# Patient Record
Sex: FEMALE | Race: WHITE | NOT HISPANIC OR LATINO | Employment: UNEMPLOYED | URBAN - METROPOLITAN AREA
[De-identification: names, ages, dates, MRNs, and addresses within clinical notes are randomized per-mention and may not be internally consistent; named-entity substitution may affect disease eponyms.]

---

## 2018-08-13 ENCOUNTER — TELEPHONE (OUTPATIENT)
Dept: OBSTETRICS AND GYNECOLOGY | Facility: CLINIC | Age: 18
End: 2018-08-13

## 2018-08-13 NOTE — TELEPHONE ENCOUNTER
Spoke with the patient's Aunt, Ambika, and the patient wanted to go to the [facility] location. Patient scheduled for 8/16/18 @ 2:20 pm with Baltazar Kenyon.

## 2018-08-13 NOTE — TELEPHONE ENCOUNTER
----- Message from Harmony Ramirez sent at 8/13/2018  3:20 PM CDT -----  Pt Aunt(Ambika Higginbotham) at [phone]//states pt is about 5-6 month pregnant//first missed period was in March 2018//pt has not seen any Dr or Midwife since she became pregnant//is wanting to make an appt//pt has Medicaid Insurance//please call//thanks/marsha

## 2018-08-16 ENCOUNTER — PROCEDURE VISIT (OUTPATIENT)
Dept: OBSTETRICS AND GYNECOLOGY | Facility: CLINIC | Age: 18
End: 2018-08-16
Payer: MEDICAID

## 2018-08-16 ENCOUNTER — LAB VISIT (OUTPATIENT)
Dept: LAB | Facility: HOSPITAL | Age: 18
End: 2018-08-16
Attending: ADVANCED PRACTICE MIDWIFE
Payer: MEDICAID

## 2018-08-16 ENCOUNTER — INITIAL PRENATAL (OUTPATIENT)
Dept: OBSTETRICS AND GYNECOLOGY | Facility: CLINIC | Age: 18
End: 2018-08-16
Payer: MEDICAID

## 2018-08-16 VITALS — DIASTOLIC BLOOD PRESSURE: 62 MMHG | SYSTOLIC BLOOD PRESSURE: 100 MMHG | WEIGHT: 102.5 LBS

## 2018-08-16 DIAGNOSIS — O09.32 LATE PRENATAL CARE AFFECTING PREGNANCY IN SECOND TRIMESTER: ICD-10-CM

## 2018-08-16 DIAGNOSIS — Z36.89 ENCOUNTER FOR FETAL ANATOMIC SURVEY: ICD-10-CM

## 2018-08-16 DIAGNOSIS — O09.32 LATE PRENATAL CARE AFFECTING PREGNANCY IN SECOND TRIMESTER: Primary | ICD-10-CM

## 2018-08-16 LAB
ABO + RH BLD: NORMAL
BASOPHILS # BLD AUTO: 0.03 K/UL
BASOPHILS NFR BLD: 0.3 %
BLD GP AB SCN CELLS X3 SERPL QL: NORMAL
DIFFERENTIAL METHOD: ABNORMAL
EOSINOPHIL # BLD AUTO: 0 K/UL
EOSINOPHIL NFR BLD: 0.4 %
ERYTHROCYTE [DISTWIDTH] IN BLOOD BY AUTOMATED COUNT: 13.2 %
GLUCOSE SERPL-MCNC: 132 MG/DL
HCT VFR BLD AUTO: 33.1 %
HGB BLD-MCNC: 11.6 G/DL
IMM GRANULOCYTES # BLD AUTO: 0.21 K/UL
IMM GRANULOCYTES NFR BLD AUTO: 1.9 %
LYMPHOCYTES # BLD AUTO: 2.1 K/UL
LYMPHOCYTES NFR BLD: 18.9 %
MCH RBC QN AUTO: 33.6 PG
MCHC RBC AUTO-ENTMCNC: 35 G/DL
MCV RBC AUTO: 96 FL
MONOCYTES # BLD AUTO: 0.6 K/UL
MONOCYTES NFR BLD: 5.4 %
NEUTROPHILS # BLD AUTO: 8 K/UL
NEUTROPHILS NFR BLD: 73.1 %
NRBC BLD-RTO: 0 /100 WBC
PLATELET # BLD AUTO: 227 K/UL
PMV BLD AUTO: 11.4 FL
RBC # BLD AUTO: 3.45 M/UL
WBC # BLD AUTO: 10.94 K/UL

## 2018-08-16 PROCEDURE — 76805 OB US >/= 14 WKS SNGL FETUS: CPT | Mod: PBBFAC | Performed by: OBSTETRICS & GYNECOLOGY

## 2018-08-16 PROCEDURE — 87340 HEPATITIS B SURFACE AG IA: CPT

## 2018-08-16 PROCEDURE — 99204 OFFICE O/P NEW MOD 45 MIN: CPT | Mod: TH,S$PBB,, | Performed by: ADVANCED PRACTICE MIDWIFE

## 2018-08-16 PROCEDURE — 87086 URINE CULTURE/COLONY COUNT: CPT

## 2018-08-16 PROCEDURE — 86592 SYPHILIS TEST NON-TREP QUAL: CPT

## 2018-08-16 PROCEDURE — 86762 RUBELLA ANTIBODY: CPT

## 2018-08-16 PROCEDURE — 86901 BLOOD TYPING SEROLOGIC RH(D): CPT

## 2018-08-16 PROCEDURE — 99213 OFFICE O/P EST LOW 20 MIN: CPT | Mod: PBBFAC,TH,25 | Performed by: ADVANCED PRACTICE MIDWIFE

## 2018-08-16 PROCEDURE — 86703 HIV-1/HIV-2 1 RESULT ANTBDY: CPT

## 2018-08-16 PROCEDURE — 85025 COMPLETE CBC W/AUTO DIFF WBC: CPT

## 2018-08-16 PROCEDURE — 82950 GLUCOSE TEST: CPT

## 2018-08-16 PROCEDURE — 99999 PR PBB SHADOW E&M-EST. PATIENT-LVL III: CPT | Mod: PBBFAC,,, | Performed by: ADVANCED PRACTICE MIDWIFE

## 2018-08-16 PROCEDURE — 76805 OB US >/= 14 WKS SNGL FETUS: CPT | Mod: 26,S$PBB,, | Performed by: OBSTETRICS & GYNECOLOGY

## 2018-08-16 PROCEDURE — 87491 CHLMYD TRACH DNA AMP PROBE: CPT

## 2018-08-16 RX ORDER — PRENATAL WITH FERROUS FUM AND FOLIC ACID 3080; 920; 120; 400; 22; 1.84; 3; 20; 10; 1; 12; 200; 27; 25; 2 [IU]/1; [IU]/1; MG/1; [IU]/1; MG/1; MG/1; MG/1; MG/1; MG/1; MG/1; UG/1; MG/1; MG/1; MG/1; MG/1
TABLET ORAL
Status: ON HOLD | COMMUNITY
Start: 2018-07-26 | End: 2018-11-07 | Stop reason: HOSPADM

## 2018-08-16 NOTE — PROGRESS NOTES
Pt here for new ob visit, late to prenatal care  Oriented to the practice including EL/MD collaboration  Labs today including new ob labs and 1 hr gtt  Introduced to Coffective juan  Blue bag materials reviewed  Warning signs discussed  Thinking about giving the baby up for adoption, spoke with [facility]. Here today with aunt.  US today    DAVID Jenkins

## 2018-08-17 LAB
HBV SURFACE AG SERPL QL IA: NEGATIVE
HIV 1+2 AB+HIV1 P24 AG SERPL QL IA: NEGATIVE
RPR SER QL: NORMAL
RUBV IGG SER-ACNC: 28.1 IU/ML
RUBV IGG SER-IMP: REACTIVE

## 2018-08-18 LAB — BACTERIA UR CULT: NO GROWTH

## 2018-08-19 LAB
C TRACH DNA SPEC QL NAA+PROBE: NOT DETECTED
N GONORRHOEA DNA SPEC QL NAA+PROBE: NOT DETECTED

## 2018-08-30 ENCOUNTER — ROUTINE PRENATAL (OUTPATIENT)
Dept: OBSTETRICS AND GYNECOLOGY | Facility: CLINIC | Age: 18
End: 2018-08-30
Payer: MEDICAID

## 2018-08-30 VITALS — SYSTOLIC BLOOD PRESSURE: 112 MMHG | WEIGHT: 104.06 LBS | DIASTOLIC BLOOD PRESSURE: 70 MMHG

## 2018-08-30 DIAGNOSIS — Z23 NEED FOR DIPHTHERIA-TETANUS-PERTUSSIS (TDAP) VACCINE: ICD-10-CM

## 2018-08-30 DIAGNOSIS — O09.33 LATE PRENATAL CARE AFFECTING PREGNANCY IN THIRD TRIMESTER: Primary | ICD-10-CM

## 2018-08-30 PROCEDURE — 99213 OFFICE O/P EST LOW 20 MIN: CPT | Mod: TH,S$PBB,, | Performed by: ADVANCED PRACTICE MIDWIFE

## 2018-08-30 PROCEDURE — 99213 OFFICE O/P EST LOW 20 MIN: CPT | Mod: PBBFAC,TH | Performed by: ADVANCED PRACTICE MIDWIFE

## 2018-08-30 PROCEDURE — 99999 PR PBB SHADOW E&M-EST. PATIENT-LVL III: CPT | Mod: PBBFAC,,, | Performed by: ADVANCED PRACTICE MIDWIFE

## 2018-08-30 PROCEDURE — 90471 IMMUNIZATION ADMIN: CPT | Mod: PBBFAC

## 2018-08-30 NOTE — NURSING
Used two patient identifiers. Verified allergies. Tdap IM injection given to right deltoid; patient tolerated well. Asked patient to remain in clinic for 15 minutes to be monitored for adverse reaction. Patient verbalized understanding.

## 2018-08-30 NOTE — PROGRESS NOTES
Doing well, some tiredness, suggestions made  Tdap given today   PTL precautions and FM reviewed, when to go to hospital   Labs reviewed   Coffective counseling sheet Keep Baby Close discussed with mother. Reinforced rooming in practices, continued skin to skin, and quiet hours as requested by mother.  Encouraged mother to download Coffect[employer] mobile juan if she has not already done so. Mother verbalizes understanding.

## 2018-09-13 ENCOUNTER — ROUTINE PRENATAL (OUTPATIENT)
Dept: OBSTETRICS AND GYNECOLOGY | Facility: CLINIC | Age: 18
End: 2018-09-13
Payer: MEDICAID

## 2018-09-13 VITALS — SYSTOLIC BLOOD PRESSURE: 96 MMHG | DIASTOLIC BLOOD PRESSURE: 68 MMHG | WEIGHT: 108.44 LBS

## 2018-09-13 DIAGNOSIS — O26.849 FETAL SIZE INCONSISTENT WITH DATES: ICD-10-CM

## 2018-09-13 DIAGNOSIS — O09.32 LATE PRENATAL CARE AFFECTING PREGNANCY IN SECOND TRIMESTER: Primary | ICD-10-CM

## 2018-09-13 PROCEDURE — 99212 OFFICE O/P EST SF 10 MIN: CPT | Mod: TH,S$PBB,, | Performed by: ADVANCED PRACTICE MIDWIFE

## 2018-09-13 PROCEDURE — 99212 OFFICE O/P EST SF 10 MIN: CPT | Mod: PBBFAC,TH | Performed by: ADVANCED PRACTICE MIDWIFE

## 2018-09-13 PROCEDURE — 99999 PR PBB SHADOW E&M-EST. PATIENT-LVL II: CPT | Mod: PBBFAC,,, | Performed by: ADVANCED PRACTICE MIDWIFE

## 2018-09-13 NOTE — PROGRESS NOTES
Doing well, reports fm and denies vb lof or ctx   S<D  Still feeling fatigued encouraged to get outside and start walking   RTC 2 weeks with US

## 2018-09-26 ENCOUNTER — PROCEDURE VISIT (OUTPATIENT)
Dept: OBSTETRICS AND GYNECOLOGY | Facility: CLINIC | Age: 18
End: 2018-09-26
Payer: MEDICAID

## 2018-09-26 ENCOUNTER — ROUTINE PRENATAL (OUTPATIENT)
Dept: OBSTETRICS AND GYNECOLOGY | Facility: CLINIC | Age: 18
End: 2018-09-26
Payer: MEDICAID

## 2018-09-26 VITALS — SYSTOLIC BLOOD PRESSURE: 102 MMHG | WEIGHT: 110.25 LBS | DIASTOLIC BLOOD PRESSURE: 58 MMHG

## 2018-09-26 DIAGNOSIS — Z3A.33 33 WEEKS GESTATION OF PREGNANCY: ICD-10-CM

## 2018-09-26 DIAGNOSIS — O26.849 FETAL SIZE INCONSISTENT WITH DATES: ICD-10-CM

## 2018-09-26 DIAGNOSIS — O09.32 LATE PRENATAL CARE AFFECTING PREGNANCY IN SECOND TRIMESTER: Primary | ICD-10-CM

## 2018-09-26 PROCEDURE — 76819 FETAL BIOPHYS PROFIL W/O NST: CPT | Mod: 26,59,S$PBB, | Performed by: OBSTETRICS & GYNECOLOGY

## 2018-09-26 PROCEDURE — 99212 OFFICE O/P EST SF 10 MIN: CPT | Mod: TH,S$PBB,, | Performed by: MIDWIFE

## 2018-09-26 PROCEDURE — 76819 FETAL BIOPHYS PROFIL W/O NST: CPT | Mod: 59,PBBFAC | Performed by: OBSTETRICS & GYNECOLOGY

## 2018-09-26 PROCEDURE — 99213 OFFICE O/P EST LOW 20 MIN: CPT | Mod: PBBFAC,TH | Performed by: MIDWIFE

## 2018-09-26 PROCEDURE — 76816 OB US FOLLOW-UP PER FETUS: CPT | Mod: 59,PBBFAC | Performed by: OBSTETRICS & GYNECOLOGY

## 2018-09-26 PROCEDURE — 99999 PR PBB SHADOW E&M-EST. PATIENT-LVL III: CPT | Mod: PBBFAC,,, | Performed by: MIDWIFE

## 2018-09-26 PROCEDURE — 76816 OB US FOLLOW-UP PER FETUS: CPT | Mod: 26,59,S$PBB, | Performed by: OBSTETRICS & GYNECOLOGY

## 2018-09-26 NOTE — PROGRESS NOTES
Doing well, good fm, US today for S<D, EFW 3# 13oz, 10%, MVP 4.4, AC 36%, FL <1%, will US at 36 wks to re assess growth, discussed GBS, rtc 2 wks

## 2018-09-26 NOTE — PATIENT INSTRUCTIONS
Kick Counts    Its normal to worry about your babys health. One way you can know your babys doing well is to record the babys movements once a day. This is called a kick count. Remember to take your kick count records to all your appointments with your healthcare provider.  How to count kicks  Here are tips for counting kicks:  · Choose a time when the baby is active, such as after a meal.   · Sit comfortably or lie on your side.   · The first time the baby moves, write down the time.   · Count each movement until the baby has moved 10 times. This can take from 20 minutes to 2 hours.   · Try to do it at the same time each day.  When to call your healthcare provider  Call your healthcare provider right away if you notice any of the following:  · Your baby moves fewer than 10 times in 2 hours while youre doing kick counts.  · Your baby moves much less often than on the days before.  · You have not felt your baby move all day.  Date Last Reviewed: 2015-2017 [employer]. [address] [place] [address]. All rights reserved. This information is not intended as a substitute for professional medical care. Always follow your healthcare professional's instructions.        Premature Labor    Premature labor ( labor) is when symptoms of labor occur before 37 weeks of pregnancy. (This is 3 weeks before your due date.) Premature labor can lead to premature delivery. This means giving birth to your baby early. Babies need at least 37 weeks of pregnancy for all the organs to develop normally. The earlier the delivery, the greater the risks to the baby.  In most cases, the cause of premature labor is unknown. But certain factors may make the problem more likely. These include:  · History of premature labor with other pregnancies  · Smoking  · Alcohol or substance abuse  · Low pre-pregnancy weight or weight gain during pregnancy  · Short time period between pregnancies  · Being  pregnant with twins, triplets, or more  · History of certain types of surgery on the cervix or uterus  · Having a short cervix  · Certain infections  There are a number of other risk factors. Ask your healthcare provider to help you understand the risk factors specific to your case. Then find out what you can do to control or reduce them.  Contractions are one of the main signs of premature labor. A contraction is different from cramping. It may feel painful and the belly (abdomen) may get hard. It can last from a few seconds to a few minutes. Some women may feel only a sense of pressure in the belly, thighs, rectum, or vagina. Some may feel only the hardening of the uterus without pain or pressure. Or there may be a constant pain the lower back, which spreads forward toward the belly.    Premature labor can often be treated with medicines. A hospital stay will likely be needed. If labor is stopped successfully and you and your baby are both healthy, you may be discharged to continue care at home.  Home care  · Ask your provider any questions you have. Be certain you understand how to care for yourself at home. Also follow all recommendations given by your healthcare providers.  · Learn the signs of premature labor. Watch for these signs when you get home.  · Limit or restrict activities as advised. This may include stopping certain physical activities and cutting back hours at work.  · Avoid doing any strenuous work. Ask family and friends for help with tasks and support at home, if needed.  · Dont smoke, drink alcohol, or use other harmful substances.  · Take steps to reduce stress.  · Report any unusual symptoms to your provider.  Follow-up care  Follow up with your healthcare provider, or as directed. Weekly visits with your provider may be needed.  When to seek medical advice  Call your healthcare provider right away if any of these occur:  · Regular or frequent contractions, whether they are painful or  not  · Pressure in the pelvis  · Pressure in the lower belly or mild cramping in your belly with or without diarrhea  · Constant low, dull backache  · Gush or slow leaking of water from your vagina  · Change in vaginal discharge (watery, mucus, or bloody)  · Any vaginal bleeding  · Decreased movement of your baby  Date Last Reviewed: 2015  © 5918-6899 [employer]. [address] [place] [address], [place] [address]. All rights reserved. This information is not intended as a substitute for professional medical care. Always follow your healthcare professional's instructions.        Understanding  Labor  Going into labor before your 37th week of pregnancy is called  labor.  labor can cause your baby to be born too soon. This can lead to a number of health problems that may affect your baby.     Before labor, the cervix is thick and closed.       In  labor, the cervix begins to efface (thin) and dilate (open).      Symptoms of  labor  If you believe youre having  labor, get medical help right away. Contractions alone dont mean youre in  labor. What matters more are changes in your cervix (the lower end of the uterus). Symptoms of  labor include:  · Four or more contractions per hour  · Strong contractions  · Constant menstrual-like cramping  · Low-back pain  · Mucous or bloody vaginal discharge  · Bleeding or spotting in the second or third trimester  Evaluating  labor  Your healthcare provider will try to find out whether youre in  labor or whether youre just having contractions. He or she may watch you for a few hours. The following tests may be done:  · Pelvic exam to see if your cervix has effaced (thinned) and dilated (opened)  · Uterine activity monitoring to detect contractions  · Fetal monitoring to check the health of your baby  · Ultrasound to check your babys size and position  · Amniocentesis to check how mature your babys  lungs are  Caring for yourself at home  If you have  contractions, but your cervix is still thick and closed, your healthcare provider may ask you to do the following at home:  · Drink plenty of water.  · Do fewer activities.  · Rest in bed on your side.  · Avoid intercourse and nipple stimulation.  When to call your healthcare provider  Call your healthcare provider if you notice any of these:  · Four or more contractions per hour  · Bag of water breaks  · Bleeding or spotting   If you need hospital care   labor often requires that you have hospital care and complete bed rest. You may have an IV (intravenous) line to get fluids. You may be given pills or injections to help prevent contractions. Finally, you may receive medicine (corticosteroids) that helps your babys lungs mature more rapidly.  Are you at risk?  Any pregnant woman can have  labor. It may start for no reason. But these risk factors can increase your chances:  · Past  labor or past early birth  · Smoking, drug, or alcohol use during pregnancy  · Multiple fetuses (twins or more)  · Problems with the shape of the uterus  · Bleeding during the pregnancy  The dangers of  birth  A baby born too soon may have health problems. This is because the baby didnt have enough time to mature. Some of the risks for your baby include:  · Not breastfeeding or feeding well  · Having immature lungs  · Bleeding in the brain  · Dying  Reaching term  Your goal is to get as close to term as you can before giving birth. The closer you get to term, the higher your chance of having a healthy baby. Work with your healthcare provider. Together, you can take steps that may keep you from giving birth too early.  Date Last Reviewed: 2016-2017 [employer]. [address] [place] [address], [place] [address]. All rights reserved. This information is not intended as a substitute for professional medical care. Always follow your  healthcare professional's instructions.

## 2018-10-12 ENCOUNTER — ROUTINE PRENATAL (OUTPATIENT)
Dept: OBSTETRICS AND GYNECOLOGY | Facility: CLINIC | Age: 18
End: 2018-10-12
Payer: MEDICAID

## 2018-10-12 VITALS — WEIGHT: 116.88 LBS | SYSTOLIC BLOOD PRESSURE: 108 MMHG | DIASTOLIC BLOOD PRESSURE: 76 MMHG

## 2018-10-12 DIAGNOSIS — O09.32 LATE PRENATAL CARE AFFECTING PREGNANCY IN SECOND TRIMESTER: Primary | ICD-10-CM

## 2018-10-12 PROCEDURE — 99212 OFFICE O/P EST SF 10 MIN: CPT | Mod: PBBFAC,TH | Performed by: ADVANCED PRACTICE MIDWIFE

## 2018-10-12 PROCEDURE — 99999 PR PBB SHADOW E&M-EST. PATIENT-LVL II: CPT | Mod: PBBFAC,,, | Performed by: ADVANCED PRACTICE MIDWIFE

## 2018-10-12 PROCEDURE — 99212 OFFICE O/P EST SF 10 MIN: CPT | Mod: TH,S$PBB,, | Performed by: ADVANCED PRACTICE MIDWIFE

## 2018-10-12 NOTE — PROGRESS NOTES
Doing well, reports fm and denies vb lof or cts   Labor precautions reviewed and when to go to ldr   RTC 1 week with GBS and US for growth

## 2018-10-22 ENCOUNTER — ROUTINE PRENATAL (OUTPATIENT)
Dept: OBSTETRICS AND GYNECOLOGY | Facility: CLINIC | Age: 18
End: 2018-10-22
Payer: MEDICAID

## 2018-10-22 ENCOUNTER — PROCEDURE VISIT (OUTPATIENT)
Dept: OBSTETRICS AND GYNECOLOGY | Facility: CLINIC | Age: 18
End: 2018-10-22
Payer: MEDICAID

## 2018-10-22 VITALS — DIASTOLIC BLOOD PRESSURE: 70 MMHG | WEIGHT: 115.94 LBS | SYSTOLIC BLOOD PRESSURE: 110 MMHG

## 2018-10-22 DIAGNOSIS — O26.849 FETAL SIZE INCONSISTENT WITH DATES: ICD-10-CM

## 2018-10-22 DIAGNOSIS — O28.3 ABNORMAL FETAL ULTRASOUND: ICD-10-CM

## 2018-10-22 DIAGNOSIS — O09.32 LATE PRENATAL CARE AFFECTING PREGNANCY IN SECOND TRIMESTER: ICD-10-CM

## 2018-10-22 DIAGNOSIS — O09.32 LATE PRENATAL CARE AFFECTING PREGNANCY IN SECOND TRIMESTER: Primary | ICD-10-CM

## 2018-10-22 LAB
AMPHET+METHAMPHET UR QL: NEGATIVE
BARBITURATES UR QL SCN>200 NG/ML: NEGATIVE
BENZODIAZ UR QL SCN>200 NG/ML: NEGATIVE
BZE UR QL SCN: NEGATIVE
CANNABINOIDS UR QL SCN: NEGATIVE
CREAT UR-MCNC: 178 MG/DL
METHADONE UR QL SCN>300 NG/ML: NEGATIVE
OPIATES UR QL SCN: NEGATIVE
PCP UR QL SCN>25 NG/ML: NEGATIVE
TOXICOLOGY INFORMATION: NORMAL

## 2018-10-22 PROCEDURE — 87081 CULTURE SCREEN ONLY: CPT

## 2018-10-22 PROCEDURE — 76816 OB US FOLLOW-UP PER FETUS: CPT | Mod: 26,S$PBB,, | Performed by: OBSTETRICS & GYNECOLOGY

## 2018-10-22 PROCEDURE — 76819 FETAL BIOPHYS PROFIL W/O NST: CPT | Mod: PBBFAC | Performed by: OBSTETRICS & GYNECOLOGY

## 2018-10-22 PROCEDURE — 99213 OFFICE O/P EST LOW 20 MIN: CPT | Mod: PBBFAC,TH | Performed by: ADVANCED PRACTICE MIDWIFE

## 2018-10-22 PROCEDURE — 76816 OB US FOLLOW-UP PER FETUS: CPT | Mod: PBBFAC | Performed by: OBSTETRICS & GYNECOLOGY

## 2018-10-22 PROCEDURE — 80307 DRUG TEST PRSMV CHEM ANLYZR: CPT

## 2018-10-22 PROCEDURE — 99999 PR PBB SHADOW E&M-EST. PATIENT-LVL III: CPT | Mod: PBBFAC,,, | Performed by: ADVANCED PRACTICE MIDWIFE

## 2018-10-22 PROCEDURE — 76819 FETAL BIOPHYS PROFIL W/O NST: CPT | Mod: 26,S$PBB,, | Performed by: OBSTETRICS & GYNECOLOGY

## 2018-10-22 PROCEDURE — 99213 OFFICE O/P EST LOW 20 MIN: CPT | Mod: TH,S$PBB,, | Performed by: ADVANCED PRACTICE MIDWIFE

## 2018-10-22 NOTE — PROGRESS NOTES
Doing well today with complaints of head cold. No color to mucus and not currently taking any meds. Told pt she can take OTC meds that do not have decongestant.   US today shows BPP 8/8, Placenta anterior, Cephalic presentation. DORETHA 4.8cm with MVP of 3.3 cm. EFW 5#12oz (15%). HC and fetal long bones measuring <1%. FHR ranges from 105-126 throughout scan. Discussed US with pt and made an appt to see MFM on Monday 10/29.  GBS collected today. Pt declines VE.   Denies UC or cramping.   Reviewed FKC/ROM/Labor/Bleeding precautions.

## 2018-10-25 LAB — BACTERIA SPEC AEROBE CULT: NORMAL

## 2018-10-29 ENCOUNTER — ROUTINE PRENATAL (OUTPATIENT)
Dept: OBSTETRICS AND GYNECOLOGY | Facility: CLINIC | Age: 18
End: 2018-10-29
Payer: MEDICAID

## 2018-10-29 VITALS — SYSTOLIC BLOOD PRESSURE: 110 MMHG | WEIGHT: 119.25 LBS | DIASTOLIC BLOOD PRESSURE: 70 MMHG

## 2018-10-29 DIAGNOSIS — O36.5930 POOR FETAL GROWTH AFFECTING MANAGEMENT OF MOTHER IN THIRD TRIMESTER, SINGLE OR UNSPECIFIED FETUS: Primary | ICD-10-CM

## 2018-10-29 PROCEDURE — 99999 PR PBB SHADOW E&M-EST. PATIENT-LVL III: CPT | Mod: PBBFAC,,, | Performed by: ADVANCED PRACTICE MIDWIFE

## 2018-10-29 PROCEDURE — 99212 OFFICE O/P EST SF 10 MIN: CPT | Mod: TH,S$PBB,, | Performed by: ADVANCED PRACTICE MIDWIFE

## 2018-10-29 PROCEDURE — 99213 OFFICE O/P EST LOW 20 MIN: CPT | Mod: PBBFAC,TH | Performed by: ADVANCED PRACTICE MIDWIFE

## 2018-10-29 NOTE — PROGRESS NOTES
Pt missed MFM appt today d/t traffic   Reports fm and denies vb lof or ctx   Labor precautions reviewed and when to go to ldr   Rescheduled MFM next week, RTC 2 weeks   Is doing fetal kick counts

## 2018-11-05 ENCOUNTER — OFFICE VISIT (OUTPATIENT)
Dept: OBSTETRICS AND GYNECOLOGY | Facility: CLINIC | Age: 18
End: 2018-11-05
Payer: MEDICAID

## 2018-11-05 ENCOUNTER — HOSPITAL ENCOUNTER (INPATIENT)
Facility: HOSPITAL | Age: 18
LOS: 2 days | Discharge: HOME | End: 2018-11-07
Attending: OBSTETRICS & GYNECOLOGY | Admitting: OBSTETRICS & GYNECOLOGY
Payer: MEDICAID

## 2018-11-05 DIAGNOSIS — O36.5930 IUGR (INTRAUTERINE GROWTH RESTRICTION) AFFECTING CARE OF MOTHER, THIRD TRIMESTER, NOT APPLICABLE OR UNSPECIFIED FETUS: ICD-10-CM

## 2018-11-05 DIAGNOSIS — O36.5930 POOR FETAL GROWTH AFFECTING MANAGEMENT OF MOTHER IN THIRD TRIMESTER, SINGLE OR UNSPECIFIED FETUS: ICD-10-CM

## 2018-11-05 LAB
ABO + RH BLD: NORMAL
ALBUMIN SERPL BCP-MCNC: 2.6 G/DL
ALP SERPL-CCNC: 214 U/L
ALT SERPL W/O P-5'-P-CCNC: 7 U/L
ANION GAP SERPL CALC-SCNC: 11 MMOL/L
AST SERPL-CCNC: 22 U/L
BASOPHILS # BLD AUTO: 0.01 K/UL
BASOPHILS NFR BLD: 0.1 %
BILIRUB SERPL-MCNC: 0.4 MG/DL
BLD GP AB SCN CELLS X3 SERPL QL: NORMAL
BUN SERPL-MCNC: 9 MG/DL
CALCIUM SERPL-MCNC: 8.9 MG/DL
CHLORIDE SERPL-SCNC: 105 MMOL/L
CO2 SERPL-SCNC: 17 MMOL/L
CREAT SERPL-MCNC: 0.6 MG/DL
CREAT UR-MCNC: 82.2 MG/DL
DIFFERENTIAL METHOD: ABNORMAL
EOSINOPHIL # BLD AUTO: 0 K/UL
EOSINOPHIL NFR BLD: 0.3 %
ERYTHROCYTE [DISTWIDTH] IN BLOOD BY AUTOMATED COUNT: 12.9 %
EST. GFR  (AFRICAN AMERICAN): >60 ML/MIN/1.73 M^2
EST. GFR  (NON AFRICAN AMERICAN): >60 ML/MIN/1.73 M^2
GLUCOSE SERPL-MCNC: 96 MG/DL
HCT VFR BLD AUTO: 36.3 %
HGB BLD-MCNC: 13 G/DL
HIV1+2 IGG SERPL QL IA.RAPID: NEGATIVE
LYMPHOCYTES # BLD AUTO: 2.3 K/UL
LYMPHOCYTES NFR BLD: 18 %
MCH RBC QN AUTO: 33.1 PG
MCHC RBC AUTO-ENTMCNC: 35.8 G/DL
MCV RBC AUTO: 92 FL
MONOCYTES # BLD AUTO: 0.7 K/UL
MONOCYTES NFR BLD: 5.2 %
NEUTROPHILS # BLD AUTO: 9.9 K/UL
NEUTROPHILS NFR BLD: 76.4 %
PLATELET # BLD AUTO: 193 K/UL
PMV BLD AUTO: 12.2 FL
POTASSIUM SERPL-SCNC: 4.5 MMOL/L
PROT SERPL-MCNC: 6.4 G/DL
PROT UR-MCNC: 301 MG/DL
PROT/CREAT UR: 3.66 MG/G{CREAT}
RBC # BLD AUTO: 3.93 M/UL
RPR SER QL: NORMAL
SODIUM SERPL-SCNC: 133 MMOL/L
WBC # BLD AUTO: 12.96 K/UL

## 2018-11-05 PROCEDURE — 86592 SYPHILIS TEST NON-TREP QUAL: CPT

## 2018-11-05 PROCEDURE — 76819 FETAL BIOPHYS PROFIL W/O NST: CPT | Mod: PBBFAC | Performed by: OBSTETRICS & GYNECOLOGY

## 2018-11-05 PROCEDURE — 25000003 PHARM REV CODE 250: Performed by: ADVANCED PRACTICE MIDWIFE

## 2018-11-05 PROCEDURE — 72100002 HC LABOR CARE, 1ST 8 HOURS

## 2018-11-05 PROCEDURE — 76811 OB US DETAILED SNGL FETUS: CPT | Mod: PBBFAC | Performed by: OBSTETRICS & GYNECOLOGY

## 2018-11-05 PROCEDURE — 76819 FETAL BIOPHYS PROFIL W/O NST: CPT | Mod: 26,S$PBB,, | Performed by: OBSTETRICS & GYNECOLOGY

## 2018-11-05 PROCEDURE — 76811 OB US DETAILED SNGL FETUS: CPT | Mod: 26,S$PBB,, | Performed by: OBSTETRICS & GYNECOLOGY

## 2018-11-05 PROCEDURE — 85025 COMPLETE CBC W/AUTO DIFF WBC: CPT

## 2018-11-05 PROCEDURE — 99211 OFF/OP EST MAY X REQ PHY/QHP: CPT | Mod: PBBFAC,TH

## 2018-11-05 PROCEDURE — 72100003 HC LABOR CARE, EA. ADDL. 8 HRS

## 2018-11-05 PROCEDURE — 84156 ASSAY OF PROTEIN URINE: CPT

## 2018-11-05 PROCEDURE — 99999 PR PBB SHADOW E&M-EST. PATIENT-LVL I: CPT | Mod: PBBFAC,,,

## 2018-11-05 PROCEDURE — 99213 OFFICE O/P EST LOW 20 MIN: CPT | Mod: 25,S$PBB,TH, | Performed by: OBSTETRICS & GYNECOLOGY

## 2018-11-05 PROCEDURE — 80053 COMPREHEN METABOLIC PANEL: CPT

## 2018-11-05 PROCEDURE — 11000001 HC ACUTE MED/SURG PRIVATE ROOM

## 2018-11-05 PROCEDURE — 86703 HIV-1/HIV-2 1 RESULT ANTBDY: CPT

## 2018-11-05 PROCEDURE — 76820 UMBILICAL ARTERY ECHO: CPT | Mod: 26,S$PBB,, | Performed by: OBSTETRICS & GYNECOLOGY

## 2018-11-05 PROCEDURE — 86901 BLOOD TYPING SEROLOGIC RH(D): CPT

## 2018-11-05 PROCEDURE — 76820 UMBILICAL ARTERY ECHO: CPT | Mod: PBBFAC | Performed by: OBSTETRICS & GYNECOLOGY

## 2018-11-05 RX ORDER — OXYTOCIN/RINGER'S LACTATE 20/1000 ML
2 PLASTIC BAG, INJECTION (ML) INTRAVENOUS CONTINUOUS
Status: DISCONTINUED | OUTPATIENT
Start: 2018-11-05 | End: 2018-11-06

## 2018-11-05 RX ORDER — MISOPROSTOL 100 MCG
25 TABLET ORAL EVERY 4 HOURS
Status: DISCONTINUED | OUTPATIENT
Start: 2018-11-05 | End: 2018-11-06

## 2018-11-05 RX ORDER — OXYTOCIN/RINGER'S LACTATE 20/1000 ML
333 PLASTIC BAG, INJECTION (ML) INTRAVENOUS CONTINUOUS
Status: ACTIVE | OUTPATIENT
Start: 2018-11-05 | End: 2018-11-05

## 2018-11-05 RX ORDER — ONDANSETRON 8 MG/1
8 TABLET, ORALLY DISINTEGRATING ORAL EVERY 8 HOURS PRN
Status: DISCONTINUED | OUTPATIENT
Start: 2018-11-05 | End: 2018-11-06

## 2018-11-05 RX ORDER — SODIUM CHLORIDE 9 MG/ML
INJECTION, SOLUTION INTRAVENOUS
Status: DISCONTINUED | OUTPATIENT
Start: 2018-11-05 | End: 2018-11-06

## 2018-11-05 RX ORDER — SODIUM CHLORIDE, SODIUM LACTATE, POTASSIUM CHLORIDE, CALCIUM CHLORIDE 600; 310; 30; 20 MG/100ML; MG/100ML; MG/100ML; MG/100ML
INJECTION, SOLUTION INTRAVENOUS CONTINUOUS
Status: DISCONTINUED | OUTPATIENT
Start: 2018-11-05 | End: 2018-11-06

## 2018-11-05 RX ORDER — TERBUTALINE SULFATE 1 MG/ML
0.25 INJECTION SUBCUTANEOUS
Status: DISCONTINUED | OUTPATIENT
Start: 2018-11-05 | End: 2018-11-06

## 2018-11-05 RX ORDER — OXYTOCIN/RINGER'S LACTATE 20/1000 ML
41.7 PLASTIC BAG, INJECTION (ML) INTRAVENOUS CONTINUOUS
Status: ACTIVE | OUTPATIENT
Start: 2018-11-05 | End: 2018-11-05

## 2018-11-05 RX ORDER — OXYTOCIN/RINGER'S LACTATE 20/1000 ML
10 PLASTIC BAG, INJECTION (ML) INTRAVENOUS ONCE
Status: DISCONTINUED | OUTPATIENT
Start: 2018-11-05 | End: 2018-11-06

## 2018-11-05 RX ORDER — ZOLPIDEM TARTRATE 5 MG/1
5 TABLET ORAL ONCE AS NEEDED
Status: COMPLETED | OUTPATIENT
Start: 2018-11-05 | End: 2018-11-05

## 2018-11-05 RX ADMIN — Medication 25 MCG: at 06:11

## 2018-11-05 RX ADMIN — Medication 25 MCG: at 11:11

## 2018-11-05 RX ADMIN — Medication 25 MCG: at 02:11

## 2018-11-05 RX ADMIN — ZOLPIDEM TARTRATE 5 MG: 5 TABLET ORAL at 08:11

## 2018-11-05 NOTE — H&P
[facility] -   Obstetrics  History & Physical    Patient Name: Ros Lord  MRN: [id number]  Admission Date: 2018  Primary Care Provider: Primary Doctor No    Subjective:     Principal Problem:<principal problem not specified>    History of Present Illness:   IUP at 38w5d sent from office for IUGR< oligo BPP     Obstetric HPI:  Patient reports None contractions, active fetal movement, No vaginal bleeding , No loss of fluid     This pregnancy has been complicated by late prenatal care,size < dates BUFA    Obstetric History       T0      L0     SAB0   TAB0   Ectopic0   Multiple0   Live Births0       # Outcome Date GA Lbr Yosi/2nd Weight Sex Delivery Anes PTL Lv   1 Current                 History reviewed. No pertinent past medical history.  History reviewed. No pertinent surgical history.    PTA Medications   Medication Sig    PRENATAL VITAMIN PLUS LOW IRON 27 mg iron- 1 mg Tab        Review of patient's allergies indicates:  No Known Allergies     Family History     None        Tobacco Use    Smoking status: Never Smoker   Substance and Sexual Activity    Alcohol use: No     Frequency: Never    Drug use: No    Sexual activity: Yes     Partners: Male     Review of Systems   Constitutional: Negative.    HENT: Negative.    Eyes: Negative.    Respiratory: Negative.    Cardiovascular: Negative.    Gastrointestinal: Negative.    Endocrine: Negative.    Genitourinary: Negative.    Musculoskeletal: Negative.    Skin:  Negative.   Neurological: Negative.    Hematological: Negative.    Psychiatric/Behavioral: Negative.    Breast: negative.    All other systems reviewed and are negative.     Objective:     Vital Signs (Most Recent):    Vital Signs (24h Range):           There is no height or weight on file to calculate BMI.    FHT: 140 Cat 1 (reassuring)  TOCO:  Q 15 minutes    Physical Exam:   Constitutional: She is oriented to person, place, and time. She appears  well-developed and well-nourished.     Eyes: Conjunctivae are normal. Pupils are equal, round, and reactive to light.    Neck: Normal range of motion.    Cardiovascular: Normal rate and regular rhythm.     Pulmonary/Chest: Breath sounds normal.        Abdominal: Soft.     Genitourinary: Vagina normal and uterus normal.           Musculoskeletal: Normal range of motion and moves all extremeties.       Neurological: She is alert and oriented to person, place, and time.    Skin: Skin is warm.    Psychiatric: She has a normal mood and affect. Her behavior is normal. Thought content normal.       Cervix:  Dilation:  defer  Effacement:    Station:   Presentation:      Significant Labs:  Lab Results   Component Value Date    GROUPTRH O POS 2018    HEPBSAG Negative 2018    STREPBCULT No Group B Streptococcus isolated 10/22/2018       I have personallly reviewed all pertinent lab results from the last 24 hours.    Assessment/Plan:     18 y.o. female  at 38w5d for:    No notes have been filed under this hospital service.  Service: Obstetrics and Gynecology      Kierra Sosa CNM  Obstetrics  [facility] -

## 2018-11-05 NOTE — HOSPITAL COURSE
Admitted for cytotec induction  cytotec #3 placed  SROM 0445 clear  Epidural  , BUFA, 2nd degree lac   18 PPD1 Appears to be sad, but otherwise doing well. Discussed with MSW who has met with the patient, pt desires to be discharged, precautions given

## 2018-11-05 NOTE — PROGRESS NOTES
"Discussed feeding choice with mother.  Reviewed benefits of breastfeeding and risks of formula feeding. Patient given "What to Expect in the First 48 Hours" handout. Mother states her intention is to formula feed.     Coffective counseling sheet Fall in Love discussed with mother. Reinforced immediate skin to skin, the magic first hour, importance of the first feeding and delaying routine procedures. Encouraged mother to download Coffective mobile juan if she has not already done so. Mother verbalies understanding.  "

## 2018-11-05 NOTE — PROGRESS NOTES
Consulted for IUGR    18 G1 MERARI 11/14/18; 38w5d    Prenatal record, chart and OB History reviewed  Pt interviewed and examined  Ultrasound performed  No interval problems  No leaking, bleeding or discharge  Good FM  Pt denies Hx of fevers or rash    Genetic Hx  No malformations or syndromes    Counseled  Discussed the IUGR and oligo with pt and family  Suggest moving toward delivery today  Shortened extremities appear to be familial.    Impression  =========  IUGR with EFW 2720 grams at the 7th percentile  All extremities lag by 5 weeks  There is echogenic bowel noted  Oligohydramnios  BPP is 6/8  Normal umbilical artery Dopplers.    Recommendation  ==============  Discussed case with Dr. Dave  Pt will be moved to L&D for delivery.

## 2018-11-05 NOTE — SUBJECTIVE & OBJECTIVE
Obstetric HPI:  Patient reports None contractions, active fetal movement, No vaginal bleeding , No loss of fluid     This pregnancy has been complicated by late prenatal care,size < dates BUFA    Obstetric History       T0      L0     SAB0   TAB0   Ectopic0   Multiple0   Live Births0       # Outcome Date GA Lbr Yosi/2nd Weight Sex Delivery Anes PTL Lv   1 Current                 History reviewed. No pertinent past medical history.  History reviewed. No pertinent surgical history.    PTA Medications   Medication Sig    PRENATAL VITAMIN PLUS LOW IRON 27 mg iron- 1 mg Tab        Review of patient's allergies indicates:  No Known Allergies     Family History     None        Tobacco Use    Smoking status: Never Smoker   Substance and Sexual Activity    Alcohol use: No     Frequency: Never    Drug use: No    Sexual activity: Yes     Partners: Male     Review of Systems   Constitutional: Negative.    HENT: Negative.    Eyes: Negative.    Respiratory: Negative.    Cardiovascular: Negative.    Gastrointestinal: Negative.    Endocrine: Negative.    Genitourinary: Negative.    Musculoskeletal: Negative.    Skin:  Negative.   Neurological: Negative.    Hematological: Negative.    Psychiatric/Behavioral: Negative.    Breast: negative.    All other systems reviewed and are negative.     Objective:     Vital Signs (Most Recent):    Vital Signs (24h Range):           There is no height or weight on file to calculate BMI.    FHT: 140 Cat 1 (reassuring)  TOCO:  Q 15 minutes    Physical Exam:   Constitutional: She is oriented to person, place, and time. She appears well-developed and well-nourished.     Eyes: Conjunctivae are normal. Pupils are equal, round, and reactive to light.    Neck: Normal range of motion.    Cardiovascular: Normal rate and regular rhythm.     Pulmonary/Chest: Breath sounds normal.        Abdominal: Soft.     Genitourinary: Vagina normal and uterus normal.           Musculoskeletal: Normal  range of motion and moves all extremeties.       Neurological: She is alert and oriented to person, place, and time.    Skin: Skin is warm.    Psychiatric: She has a normal mood and affect. Her behavior is normal. Thought content normal.       Cervix:  Dilation:  defer  Effacement:    Station:   Presentation:      Significant Labs:  Lab Results   Component Value Date    GROUPTRH O POS 08/16/2018    HEPBSAG Negative 08/16/2018    STREPBCULT No Group B Streptococcus isolated 10/22/2018       I have personallly reviewed all pertinent lab results from the last 24 hours.

## 2018-11-06 ENCOUNTER — ANESTHESIA (OUTPATIENT)
Dept: OBSTETRICS AND GYNECOLOGY | Facility: HOSPITAL | Age: 18
End: 2018-11-06
Payer: MEDICAID

## 2018-11-06 ENCOUNTER — ANESTHESIA EVENT (OUTPATIENT)
Dept: OBSTETRICS AND GYNECOLOGY | Facility: HOSPITAL | Age: 18
End: 2018-11-06
Payer: MEDICAID

## 2018-11-06 PROBLEM — S31.41XA VAGINAL LACERATION: Status: ACTIVE | Noted: 2018-11-06

## 2018-11-06 PROCEDURE — 63600175 PHARM REV CODE 636 W HCPCS: Performed by: NURSE ANESTHETIST, CERTIFIED REGISTERED

## 2018-11-06 PROCEDURE — 72200005 HC VAGINAL DELIVERY LEVEL II

## 2018-11-06 PROCEDURE — 72100003 HC LABOR CARE, EA. ADDL. 8 HRS

## 2018-11-06 PROCEDURE — 51701 INSERT BLADDER CATHETER: CPT

## 2018-11-06 PROCEDURE — 59409 OBSTETRICAL CARE: CPT | Mod: AT,,, | Performed by: ADVANCED PRACTICE MIDWIFE

## 2018-11-06 PROCEDURE — 25000003 PHARM REV CODE 250: Performed by: ADVANCED PRACTICE MIDWIFE

## 2018-11-06 PROCEDURE — 0KQM0ZZ REPAIR PERINEUM MUSCLE, OPEN APPROACH: ICD-10-PCS | Performed by: INTERNAL MEDICINE

## 2018-11-06 PROCEDURE — 63600175 PHARM REV CODE 636 W HCPCS: Performed by: ADVANCED PRACTICE MIDWIFE

## 2018-11-06 PROCEDURE — 3E0P7VZ INTRODUCTION OF HORMONE INTO FEMALE REPRODUCTIVE, VIA NATURAL OR ARTIFICIAL OPENING: ICD-10-PCS | Performed by: INTERNAL MEDICINE

## 2018-11-06 PROCEDURE — 25000003 PHARM REV CODE 250: Performed by: NURSE ANESTHETIST, CERTIFIED REGISTERED

## 2018-11-06 PROCEDURE — 11000001 HC ACUTE MED/SURG PRIVATE ROOM

## 2018-11-06 PROCEDURE — 27800517 HC TRAY,EPIDURAL-CONTINUOUS: Performed by: NURSE ANESTHETIST, CERTIFIED REGISTERED

## 2018-11-06 PROCEDURE — 62326 NJX INTERLAMINAR LMBR/SAC: CPT | Performed by: NURSE ANESTHETIST, CERTIFIED REGISTERED

## 2018-11-06 RX ORDER — HYDROCODONE BITARTRATE AND ACETAMINOPHEN 5; 325 MG/1; MG/1
1 TABLET ORAL EVERY 4 HOURS PRN
Status: DISCONTINUED | OUTPATIENT
Start: 2018-11-06 | End: 2018-11-07 | Stop reason: HOSPADM

## 2018-11-06 RX ORDER — LIDOCAINE HYDROCHLORIDE AND EPINEPHRINE 15; 5 MG/ML; UG/ML
INJECTION, SOLUTION EPIDURAL
Status: DISCONTINUED | OUTPATIENT
Start: 2018-11-06 | End: 2018-11-06

## 2018-11-06 RX ORDER — ROPIVACAINE HYDROCHLORIDE 2 MG/ML
INJECTION, SOLUTION EPIDURAL; INFILTRATION; PERINEURAL
Status: DISCONTINUED | OUTPATIENT
Start: 2018-11-06 | End: 2018-11-06

## 2018-11-06 RX ORDER — DIPHENHYDRAMINE HCL 25 MG
25 CAPSULE ORAL EVERY 4 HOURS PRN
Status: DISCONTINUED | OUTPATIENT
Start: 2018-11-06 | End: 2018-11-07 | Stop reason: HOSPADM

## 2018-11-06 RX ORDER — ACETAMINOPHEN 325 MG/1
650 TABLET ORAL EVERY 6 HOURS PRN
Status: DISCONTINUED | OUTPATIENT
Start: 2018-11-06 | End: 2018-11-07 | Stop reason: HOSPADM

## 2018-11-06 RX ORDER — HYDROCODONE BITARTRATE AND ACETAMINOPHEN 7.5; 325 MG/1; MG/1
1 TABLET ORAL EVERY 4 HOURS PRN
Status: DISCONTINUED | OUTPATIENT
Start: 2018-11-06 | End: 2018-11-07 | Stop reason: HOSPADM

## 2018-11-06 RX ORDER — ONDANSETRON 8 MG/1
8 TABLET, ORALLY DISINTEGRATING ORAL EVERY 8 HOURS PRN
Status: DISCONTINUED | OUTPATIENT
Start: 2018-11-06 | End: 2018-11-07 | Stop reason: HOSPADM

## 2018-11-06 RX ORDER — IBUPROFEN 600 MG/1
600 TABLET ORAL EVERY 6 HOURS
Status: DISCONTINUED | OUTPATIENT
Start: 2018-11-06 | End: 2018-11-07 | Stop reason: HOSPADM

## 2018-11-06 RX ORDER — DIPHENHYDRAMINE HYDROCHLORIDE 50 MG/ML
25 INJECTION INTRAMUSCULAR; INTRAVENOUS EVERY 4 HOURS PRN
Status: DISCONTINUED | OUTPATIENT
Start: 2018-11-06 | End: 2018-11-07 | Stop reason: HOSPADM

## 2018-11-06 RX ORDER — SODIUM CITRATE AND CITRIC ACID MONOHYDRATE 334; 500 MG/5ML; MG/5ML
30 SOLUTION ORAL ONCE
Status: DISCONTINUED | OUTPATIENT
Start: 2018-11-06 | End: 2018-11-06

## 2018-11-06 RX ORDER — OXYTOCIN/RINGER'S LACTATE 20/1000 ML
2 PLASTIC BAG, INJECTION (ML) INTRAVENOUS CONTINUOUS
Status: DISCONTINUED | OUTPATIENT
Start: 2018-11-06 | End: 2018-11-06

## 2018-11-06 RX ORDER — ROPIVACAINE IN 0.9% SOD CHL/PF 0.2 %
PLASTIC BAG, INJECTION (ML) EPIDURAL CONTINUOUS
Status: DISCONTINUED | OUTPATIENT
Start: 2018-11-06 | End: 2018-11-06

## 2018-11-06 RX ORDER — ROPIVACAINE HYDROCHLORIDE 2 MG/ML
INJECTION, SOLUTION EPIDURAL; INFILTRATION; PERINEURAL CONTINUOUS PRN
Status: DISCONTINUED | OUTPATIENT
Start: 2018-11-06 | End: 2018-11-06

## 2018-11-06 RX ORDER — FAMOTIDINE 10 MG/ML
20 INJECTION INTRAVENOUS ONCE
Status: DISCONTINUED | OUTPATIENT
Start: 2018-11-06 | End: 2018-11-06

## 2018-11-06 RX ORDER — OXYTOCIN/RINGER'S LACTATE 20/1000 ML
41.65 PLASTIC BAG, INJECTION (ML) INTRAVENOUS CONTINUOUS
Status: DISPENSED | OUTPATIENT
Start: 2018-11-06 | End: 2018-11-06

## 2018-11-06 RX ORDER — DOCUSATE SODIUM 100 MG/1
200 CAPSULE, LIQUID FILLED ORAL 2 TIMES DAILY PRN
Status: DISCONTINUED | OUTPATIENT
Start: 2018-11-06 | End: 2018-11-07 | Stop reason: HOSPADM

## 2018-11-06 RX ORDER — HYDROCORTISONE 25 MG/G
CREAM TOPICAL 3 TIMES DAILY PRN
Status: DISCONTINUED | OUTPATIENT
Start: 2018-11-06 | End: 2018-11-07 | Stop reason: HOSPADM

## 2018-11-06 RX ADMIN — SODIUM CHLORIDE, SODIUM LACTATE, POTASSIUM CHLORIDE, AND CALCIUM CHLORIDE 1000 ML: .6; .31; .03; .02 INJECTION, SOLUTION INTRAVENOUS at 04:11

## 2018-11-06 RX ADMIN — ROPIVACAINE HYDROCHLORIDE 12 ML/HR: 2 INJECTION, SOLUTION EPIDURAL; INFILTRATION at 05:11

## 2018-11-06 RX ADMIN — LIDOCAINE HYDROCHLORIDE,EPINEPHRINE BITARTRATE 3 ML: 15; .005 INJECTION, SOLUTION EPIDURAL; INFILTRATION; INTRACAUDAL; PERINEURAL at 05:11

## 2018-11-06 RX ADMIN — SODIUM CHLORIDE, SODIUM LACTATE, POTASSIUM CHLORIDE, AND CALCIUM CHLORIDE: .6; .31; .03; .02 INJECTION, SOLUTION INTRAVENOUS at 03:11

## 2018-11-06 RX ADMIN — ROPIVACAINE HYDROCHLORIDE 12 ML: 2 INJECTION, SOLUTION EPIDURAL; INFILTRATION at 05:11

## 2018-11-06 RX ADMIN — IBUPROFEN 600 MG: 600 TABLET ORAL at 05:11

## 2018-11-06 RX ADMIN — Medication 2 MILLI-UNITS/MIN: at 03:11

## 2018-11-06 NOTE — ANESTHESIA POSTPROCEDURE EVALUATION
Anesthesia Post Evaluation    Patient: Ros Lord    Procedure(s) Performed: * No procedures listed *    Final Anesthesia Type: epidural  Patient location during evaluation: labor & delivery  Patient participation: Yes- Able to Participate  Level of consciousness: awake and alert  Post-procedure vital signs: reviewed and stable  Pain management: adequate  Airway patency: patent  PONV status at discharge: No PONV  Anesthetic complications: no      Cardiovascular status: blood pressure returned to baseline  Respiratory status: unassisted, spontaneous ventilation and room air  Hydration status: euvolemic  Follow-up not needed.        Visit Vitals  /78   Pulse 105   Temp 36.8 °C (98.3 °F) (Oral)   Resp 18   Ht 5' (1.524 m)   Wt 54.1 kg (119 lb 4.3 oz)   SpO2 100%   Breastfeeding? Unknown   BMI 23.29 kg/m²       Pain/Ana Laura Score: Ana Laura Score: 10 (11/6/2018 11:45 AM)

## 2018-11-06 NOTE — ANESTHESIA PROCEDURE NOTES
Epidural    Patient location during procedure: OB   Reason for block: primary anesthetic   Diagnosis: iup   Start time: 11/6/2018 5:45 AM  Timeout: 11/6/2018 5:42 AM  End time: 11/6/2018 5:52 AM  Staffing  Anesthesiologist: Brigido Lester MD  Resident/CRNA: Santi Hernandez CRNA  Performed: anesthesiologist   Preanesthetic Checklist  Completed: patient identified, surgical consent, pre-op evaluation, timeout performed, IV checked, risks and benefits discussed, monitors and equipment checked, anesthesia consent given, hand hygiene performed and patient being monitored  Preparation  Patient position: sitting  Prep: Betadine  Patient monitoring: Pulse Ox and Blood Pressure  Epidural  Skin Anesthetic: lidocaine 1%  Skin Wheal: 3 mL  Administration type: continuous  Approach: midline  Interspace: L3-4  Injection technique: DEANNA air  Needle and Epidural Catheter  Needle type: Tuohy   Needle gauge: 17  Needle length: 3.5 inches  Catheter type: end hole  Catheter size: 19 G  Catheter at skin depth: 14 cm  Test dose: 3 mL of lidocaine 1.5% with Epi 1-to-200,000  Additional Documentation: incremental injection, negative aspiration for heme and CSF, no signs/symptoms of IV or SA injection, no paresthesia on injection, no significant pain on injection and no significant complaints from patient  Needle localization: anatomical landmarks  Assessment  Ease of block: easy  Patient's tolerance of the procedure: comfortable throughout block

## 2018-11-06 NOTE — SUBJECTIVE & OBJECTIVE
Interval History:  Ros is a 18 y.o.  at 38w6d. She is doing well.     Objective:     Vital Signs (Most Recent):  Temp: 98.6 °F (37 °C) (18 0302)  Pulse: 65 (18 041)  Resp: 18 (18)  BP: 130/88 (18) Vital Signs (24h Range):  Temp:  [98.4 °F (36.9 °C)-98.7 °F (37.1 °C)] 98.6 °F (37 °C)  Pulse:  [63-95] 65  Resp:  [16-18] 18  BP: (108-144)/() 130/88     Weight: 54.1 kg (119 lb 4.3 oz)  Body mass index is 23.29 kg/m².    FHT: 120Cat 1 (reassuring)  TOCO:  Q 2 minutes    Cervical Exam:  Dilation:  3  Effacement:  75%  Station: -3  Presentation: Vertex     Significant Labs:  Lab Results   Component Value Date    GROUPTRH O POS 2018    HEPBSAG Negative 2018    STREPBCULT No Group B Streptococcus isolated 10/22/2018       I have personallly reviewed all pertinent lab results from the last 24 hours.    Physical Exam:   Constitutional: She is oriented to person, place, and time. She appears well-developed and well-nourished.    HENT:   Head: Normocephalic.    Eyes: Pupils are equal, round, and reactive to light.    Neck: Normal range of motion.    Cardiovascular: Normal rate and regular rhythm.     Pulmonary/Chest: Effort normal and breath sounds normal.        Abdominal: Soft.             Musculoskeletal: Normal range of motion.       Neurological: She is alert and oriented to person, place, and time.    Skin: Skin is warm and dry.    Psychiatric: She has a normal mood and affect. Her behavior is normal.

## 2018-11-06 NOTE — SUBJECTIVE & OBJECTIVE
Interval History:  Ros is a 18 y.o.  at 38w6d. She is doing well.     Objective:     Vital Signs (Most Recent):  Temp: 98.4 °F (36.9 °C) (18)  Pulse: 74 (18)  Resp: 16 (18 0002)  BP: 111/64 (18) Vital Signs (24h Range):  Temp:  [98.4 °F (36.9 °C)-98.7 °F (37.1 °C)] 98.4 °F (36.9 °C)  Pulse:  [63-95] 74  Resp:  [16-18] 16  BP: (108-144)/(52-96) 111/64     Weight: 54.1 kg (119 lb 4.3 oz)  Body mass index is 23.29 kg/m².    FHT: 140Cat 1 (reassuring)  TOCO:  Q 1-4 minutes    Cervical Exam:  Dilation:  1  Effacement:  60  Station: -1  Presentation: Vertex     Significant Labs:  Lab Results   Component Value Date    GROUPTRH O POS 2018    HEPBSAG Negative 2018    STREPBCULT No Group B Streptococcus isolated 10/22/2018       I have personallly reviewed all pertinent lab results from the last 24 hours.    Physical Exam:   Constitutional: She is oriented to person, place, and time. She appears well-developed and well-nourished.    HENT:   Head: Normocephalic.    Eyes: Pupils are equal, round, and reactive to light.    Neck: Normal range of motion.    Cardiovascular: Normal rate and regular rhythm.     Pulmonary/Chest: Effort normal and breath sounds normal.        Abdominal: Soft.             Musculoskeletal: Normal range of motion.       Neurological: She is alert and oriented to person, place, and time.    Skin: Skin is warm and dry.    Psychiatric: She has a normal mood and affect. Her behavior is normal.

## 2018-11-06 NOTE — ANESTHESIA RELEASE NOTE
Anesthesia Release from PACU Note    Patient: Ros Lapeyrolerie    Procedure(s) Performed: * No procedures listed *    Anesthesia type: epidural    Post pain: Adequate analgesia    Post assessment: no apparent anesthetic complications and tolerated procedure well    Last Vitals:   Visit Vitals  /78   Pulse 105   Temp 36.8 °C (98.3 °F) (Oral)   Resp 18   Ht 5' (1.524 m)   Wt 54.1 kg (119 lb 4.3 oz)   SpO2 100%   Breastfeeding? Unknown   BMI 23.29 kg/m²       Post vital signs: stable    Level of consciousness: awake, alert  and oriented    Nausea/Vomiting: no nausea/no vomiting    Complications: none    Airway Patency: patent    Respiratory: unassisted, spontaneous ventilation, room air    Cardiovascular: stable and blood pressure at baseline    Hydration: euvolemic

## 2018-11-06 NOTE — TRANSFER OF CARE
Anesthesia Transfer of Care Note    Patient: Ros Lapeyrolerie    Procedure(s) Performed: * No procedures listed *    Patient location: Labor and Delivery    Anesthesia Type: epidural    Post pain: adequate analgesia    Post assessment: no apparent anesthetic complications    Post vital signs: stable    Level of consciousness: awake, alert and oriented    Nausea/Vomiting: no nausea/vomiting    Complications: none    Transfer of care protocol was followed      Last vitals:   Visit Vitals  /78   Pulse 105   Temp 36.8 °C (98.3 °F) (Oral)   Resp 18   Ht 5' (1.524 m)   Wt 54.1 kg (119 lb 4.3 oz)   SpO2 100%   Breastfeeding? Unknown   BMI 23.29 kg/m²

## 2018-11-06 NOTE — ANESTHESIA PREPROCEDURE EVALUATION
11/06/2018  Ros Lord is a 18 y.o., female.    Anesthesia Evaluation    I have reviewed the Patient Summary Reports.    I have reviewed the Nursing Notes.      Review of Systems  Anesthesia Hx:  No previous Anesthesia  Denies Family Hx of Anesthesia complications.   Denies Personal Hx of Anesthesia complications.   Social:  Non-Smoker    Hematology/Oncology:  Hematology Normal   Oncology Normal     EENT/Dental:EENT/Dental Normal   Cardiovascular:  Cardiovascular Normal Exercise tolerance: good     Pulmonary:  Pulmonary Normal    Renal/:  Renal/ Normal     Hepatic/GI:  Hepatic/GI Normal    Musculoskeletal:  Musculoskeletal Normal    Neurological:  Neurology Normal    Endocrine:  Endocrine Normal    Dermatological:  Skin Normal    Psych:  Psychiatric Normal           Physical Exam  General:  Well nourished    Airway/Jaw/Neck:  Airway Findings: Mouth Opening: Normal Tongue: Normal  General Airway Assessment: Adult, Average  Mallampati: II  Improves to II with phonation.  TM Distance: Normal, at least 6 cm        Eyes/Ears/Nose:  EYES/EARS/NOSE FINDINGS: Normal   Dental:  Dental Findings: In tact   Chest/Lungs:  Chest/Lungs Findings: Normal Respiratory Rate     Heart/Vascular:  Heart Findings: Rhythm: Regular Rhythm  Heart murmur: negative Vascular Findings: Normal    Abdomen:  Abdomen Findings: Normal    Musculoskeletal:  Musculoskeletal Findings: Normal   Skin:  Skin Findings: Normal    Mental Status:  Mental Status Findings:  Alert and Oriented         Anesthesia Plan  Type of Anesthesia, risks & benefits discussed:  Anesthesia Type:  epidural  Patient's Preference:   Intra-op Monitoring Plan:   Intra-op Monitoring Plan Comments:   Post Op Pain Control Plan:   Post Op Pain Control Plan Comments:   Induction:    Beta Blocker:  Patient is not currently on a Beta-Blocker (No further documentation  required).       Informed Consent: Patient understands risks and agrees with Anesthesia plan.  Questions answered. Anesthesia consent signed with patient.  ASA Score: 3     Day of Surgery Review of History & Physical: I have interviewed and examined the patient. I have reviewed the patient's H&P dated:  There are no significant changes.          Ready For Surgery From Anesthesia Perspective.

## 2018-11-06 NOTE — PROGRESS NOTES
[facility]  Obstetrics  Labor Progress Note    Patient Name: Ros Lord  MRN: [id number]  Admission Date: 2018  Hospital Length of Stay: 0 days  Attending Physician: Laura Dave MD  Primary Care Provider: Primary Doctor No    Subjective:     Principal Problem:<principal problem not specified>    Hospital Course:  Admitted for cytotec induction  cytotec #2 placed    Interval History:  Ros is a 18 y.o.  at 38w5d. She is doing well.     Objective:     Vital Signs (Most Recent):  Temp: 98.4 °F (36.9 °C) (18)  Pulse: 80 (18)  Resp: 16 (18)  BP: 110/63 (18) Vital Signs (24h Range):  Temp:  [98.4 °F (36.9 °C)-98.7 °F (37.1 °C)] 98.4 °F (36.9 °C)  Pulse:  [63-95] 80  Resp:  [16-18] 16  BP: (108-144)/(52-96) 110/63     Weight: 54.1 kg (119 lb 4.3 oz)  Body mass index is 23.29 kg/m².    FHT: 140Cat 1 (reassuring)  TOCO:  Q 2-6 minutes    Cervical Exam:  Dilation:  1.5  Effacement:  75%  Station: -2  Presentation: Vertex     Significant Labs:  Lab Results   Component Value Date    GROUPTRH O POS 2018    HEPBSAG Negative 2018    STREPBCULT No Group B Streptococcus isolated 10/22/2018       I have personallly reviewed all pertinent lab results from the last 24 hours.    Physical Exam:   Constitutional: She is oriented to person, place, and time. She appears well-developed and well-nourished.    HENT:   Head: Normocephalic.    Eyes: Pupils are equal, round, and reactive to light.    Neck: Normal range of motion.    Cardiovascular: Normal rate and regular rhythm.     Pulmonary/Chest: Effort normal and breath sounds normal.        Abdominal: Soft.             Musculoskeletal: Normal range of motion.       Neurological: She is alert and oriented to person, place, and time.    Skin: Skin is warm and dry.    Psychiatric: She has a normal mood and affect. Her behavior is normal.       Assessment/Plan:     18 y.o. female  at 38w5d  for:    Fetal size inconsistent with dates    BPP 6/8, IUGR  cytotec induction  ambien to rest           Kierra Sosa CNM  Obstetrics  [facility]

## 2018-11-06 NOTE — PROGRESS NOTES
[facility]  Obstetrics  Labor Progress Note    Patient Name: Ros Lord  MRN: [id number]  Admission Date: 2018  Hospital Length of Stay: 1 days  Attending Physician: Laura Dave MD  Primary Care Provider: Primary Doctor No    Subjective:     Principal Problem:<principal problem not specified>    Hospital Course:  Admitted for cytotec induction  cytotec #3 placed    Interval History:  Ros is a 18 y.o.  at 38w6d. She is doing well.     Objective:     Vital Signs (Most Recent):  Temp: 98.4 °F (36.9 °C) (18)  Pulse: 74 (18 0002)  Resp: 16 (18 0002)  BP: 111/64 (18) Vital Signs (24h Range):  Temp:  [98.4 °F (36.9 °C)-98.7 °F (37.1 °C)] 98.4 °F (36.9 °C)  Pulse:  [63-95] 74  Resp:  [16-18] 16  BP: (108-144)/(52-96) 111/64     Weight: 54.1 kg (119 lb 4.3 oz)  Body mass index is 23.29 kg/m².    FHT: 140Cat 1 (reassuring)  TOCO:  Q 1-4 minutes    Cervical Exam:  Dilation:  1  Effacement:  60  Station: -1  Presentation: Vertex     Significant Labs:  Lab Results   Component Value Date    GROUPTRH O POS 2018    HEPBSAG Negative 2018    STREPBCULT No Group B Streptococcus isolated 10/22/2018       I have personallly reviewed all pertinent lab results from the last 24 hours.    Physical Exam:   Constitutional: She is oriented to person, place, and time. She appears well-developed and well-nourished.    HENT:   Head: Normocephalic.    Eyes: Pupils are equal, round, and reactive to light.    Neck: Normal range of motion.    Cardiovascular: Normal rate and regular rhythm.     Pulmonary/Chest: Effort normal and breath sounds normal.        Abdominal: Soft.             Musculoskeletal: Normal range of motion.       Neurological: She is alert and oriented to person, place, and time.    Skin: Skin is warm and dry.    Psychiatric: She has a normal mood and affect. Her behavior is normal.       Assessment/Plan:     18 y.o. female  at 38w6d  for:    Fetal size inconsistent with dates    BPP 6/8, IUGR  cytotec induction  ambien to rest           Kierra Sosa CNM  Obstetrics  [facility]

## 2018-11-06 NOTE — SUBJECTIVE & OBJECTIVE
Interval History:  Ros is a 18 y.o.  at 38w5d. She is doing well.     Objective:     Vital Signs (Most Recent):  Temp: 98.4 °F (36.9 °C) (18)  Pulse: 80 (18)  Resp: 16 (18)  BP: 110/63 (18) Vital Signs (24h Range):  Temp:  [98.4 °F (36.9 °C)-98.7 °F (37.1 °C)] 98.4 °F (36.9 °C)  Pulse:  [63-95] 80  Resp:  [16-18] 16  BP: (108-144)/(52-96) 110/63     Weight: 54.1 kg (119 lb 4.3 oz)  Body mass index is 23.29 kg/m².    FHT: 140Cat 1 (reassuring)  TOCO:  Q 2-6 minutes    Cervical Exam:  Dilation:  1.5  Effacement:  75%  Station: -2  Presentation: Vertex     Significant Labs:  Lab Results   Component Value Date    GROUPTRH O POS 2018    HEPBSAG Negative 2018    STREPBCULT No Group B Streptococcus isolated 10/22/2018       I have personallly reviewed all pertinent lab results from the last 24 hours.    Physical Exam:   Constitutional: She is oriented to person, place, and time. She appears well-developed and well-nourished.    HENT:   Head: Normocephalic.    Eyes: Pupils are equal, round, and reactive to light.    Neck: Normal range of motion.    Cardiovascular: Normal rate and regular rhythm.     Pulmonary/Chest: Effort normal and breath sounds normal.        Abdominal: Soft.             Musculoskeletal: Normal range of motion.       Neurological: She is alert and oriented to person, place, and time.    Skin: Skin is warm and dry.    Psychiatric: She has a normal mood and affect. Her behavior is normal.

## 2018-11-06 NOTE — PROGRESS NOTES
[facility]  Obstetrics  Labor Progress Note    Patient Name: Ros Lord  MRN: [id number]  Admission Date: 2018  Hospital Length of Stay: 1 days  Attending Physician: Laura Dave MD  Primary Care Provider: Primary Doctor No    Subjective:     Principal Problem:<principal problem not specified>    Hospital Course:  Admitted for cytotec induction  cytotec #3 placed  SROM 0445 clear  epidural    Interval History:  Ros is a 18 y.o.  at 38w6d. She is doing well.     Objective:     Vital Signs (Most Recent):  Temp: 98.6 °F (37 °C) (18 0302)  Pulse: 65 (18 0417)  Resp: 18 (18 041)  BP: 130/88 (18 041) Vital Signs (24h Range):  Temp:  [98.4 °F (36.9 °C)-98.7 °F (37.1 °C)] 98.6 °F (37 °C)  Pulse:  [63-95] 65  Resp:  [16-18] 18  BP: (108-144)/() 130/88     Weight: 54.1 kg (119 lb 4.3 oz)  Body mass index is 23.29 kg/m².    FHT: 120Cat 1 (reassuring)  TOCO:  Q 2 minutes    Cervical Exam:  Dilation:  3  Effacement:  75%  Station: -3  Presentation: Vertex     Significant Labs:  Lab Results   Component Value Date    GROUPTRH O POS 2018    HEPBSAG Negative 2018    STREPBCULT No Group B Streptococcus isolated 10/22/2018       I have personallly reviewed all pertinent lab results from the last 24 hours.    Physical Exam:   Constitutional: She is oriented to person, place, and time. She appears well-developed and well-nourished.    HENT:   Head: Normocephalic.    Eyes: Pupils are equal, round, and reactive to light.    Neck: Normal range of motion.    Cardiovascular: Normal rate and regular rhythm.     Pulmonary/Chest: Effort normal and breath sounds normal.        Abdominal: Soft.             Musculoskeletal: Normal range of motion.       Neurological: She is alert and oriented to person, place, and time.    Skin: Skin is warm and dry.    Psychiatric: She has a normal mood and affect. Her behavior is normal.       Assessment/Plan:     18 y.o. female   at 38w6d for:    Fetal size inconsistent with dates    BPP , IUGR  cytotec induction  SROM clear  Epidural per request           Kierra Sosa CNM  Obstetrics  [facility]

## 2018-11-06 NOTE — L&D DELIVERY NOTE
[facility]   Vaginal Delivery   Operative Note    SUMMARY     Normal spontaneous vaginal delivery of live infant, was placed on mothers abdomen for skin to skin and bulb suctioning performed.  Infant delivered position HALEY over intact perineum.  Nuchal cord: No.    Spontaneous delivery of placenta and IV pitocin given noting good uterine tone.  2nd degree laceration noted and repaired in normal fashion with 2-0 AND 3-0 Vicryl, Hemostasis noted. .  Patient tolerated delivery well. Sponge needle and lap counted correctly x2.    Indications: Normal vaginal delivery  Pregnancy complicated by:   Patient Active Problem List   Diagnosis    Late prenatal care affecting pregnancy in second trimester    Fetal size inconsistent with dates    Abnormal fetal ultrasound    IUGR (intrauterine growth restriction) affecting care of mother, third trimester, not applicable or unspecified fetus    Normal vaginal delivery    Normal  (single liveborn)    Vaginal laceration     Admitting GA: 38w6d    Delivery Information for  Dusty Lord    Birth information:  YOB: 2020   Time of birth: 7:58 AM   Sex: male   Head Delivery Date/Time: 2018  7:58 AM   Delivery type: Vaginal, Spontaneous   Gestational Age: 38w6d    Delivery Providers    Delivering clinician:  Ivy Perez CNM   Provider Role    Petra Donovan RN Registered Nurse    Slime Lockett RN Registered Nurse            Measurements    Weight:    Length:           Apgars    Living status:  Living  Apgars:   1 min.:   5 min.:   10 min.:   15 min.:   20 min.:     Skin color:   0  1       Heart rate:   2  2       Reflex irritability:   2  2       Muscle tone:   2  2       Respiratory effort:   2  2       Total:   8  9       Apgars assigned by:  PETRA DONOVAN RN         Operative Delivery    Forceps attempted?:  No  Vacuum extractor attempted?:  No         Shoulder Dystocia    Shoulder dystocia present?:  No            Presentation    Presentation:  Vertex  Position:  Left Occiput Anterior           Interventions/Resuscitation    Method:  Bulb Suctioning, Tactile Stimulation       Cord    Vessels:  3 vessels  Complications:  None  Delayed Cord Clamping?:  Yes  Cord Clamped Date/Time:  2018  8:05 AM  Cord Blood Disposition:  Lab  Gases Sent?:  No  Stem Cell Collection (by MD):  No       Placenta    Placenta delivery date/time:  2018 0810  Placenta removal:  Spontaneous  Placenta appearance:  Intact  Placenta disposition:  discarded           Labor Events:       labor: No     Labor Onset Date/Time:         Dilation Complete Date/Time:         Start Pushing Date/Time:       Rupture Date/Time:              Rupture type:           Fluid Amount:        Fluid Color:        Fluid Odor:        Membrane Status (PeriCalm): SRM (Spontaneous Rupture)      Rupture Date/Time (PeriCalm): 2018 04:45:00      Fluid Amount (PeriCalm): Moderate      Fluid Color (PeriCalm): Clear       steroids: None     Antibiotics given for GBS: No     Induction: misoprostol     Indications for induction:        Augmentation: oxytocin     Indications for augmentation:       Labor complications: None     Additional complications: Iugr (Intrauterine Growth Restriction) Affecting Care Of Mother, Third Trimester, Other Fetus        Cervical ripening:                     Delivery:      Episiotomy: None     Indication for Episiotomy:       Perineal Lacerations:   Repaired:      Periurethral Laceration:   Repaired:     Labial Laceration:   Repaired:     Sulcus Laceration:   Repaired:     Vaginal Laceration:   Repaired:     Cervical Laceration:   Repaired:     Repair suture:       Repair # of packets:       Vaginal delivery QBL (mL):        QBL (mL): 0     Combined Blood Loss (mL): 0     Vaginal Sweep Performed: No     Surgicount Correct: Yes       Other providers:       Anesthesia    Method:  Epidural           Details (if applicable):  Trial of Labor      Categorization:      Priority:     Indications for :     Incision Type:       Additional  information:  Forceps:    Vacuum:    Breech:    Observed anomalies    Other (Comments):

## 2018-11-06 NOTE — PLAN OF CARE
Met with patient and her Aunt Ambika, who is also her  at [facility]. Patient signed release of information for herself and  for [facility]. She also signed Transfer of Custody paperwork. MSW explained that this is not the final paperwork. Patient expressed understanding. Patient has completed her counseling with Karin, counselor at [facility]. Discussed birth certificate. She was not aware she can choose name and complete first birth cert. Also discussed asking for extra footprints and proof of birth.   She would like adoptive parents to room in with  after they have met.   Ambika asked MSW to see if adoptive parents, waiting in waiting area, would like to come and meet them now.       2:00pm  Met with adoptive parents in waiting area. They did not feel comfortable going to meet with mother and baby until Karin arrived to hospital Karin is on her way and will meet with them when she arrives.  Discussed rooming in with . Adoptive parents are looking forward to rooming in and understand they must be present with baby the while hospital stay and cannot leave baby alone.     2:10pm  Informed patient and Ambika of above. As MSW Was leaving her room, Karin arrived. Karin was on phone with Mariam, [facility]. Mariam plans to come to hospital later as she has been the in contact with both patient and adoptive parents.   MSW took Karin to meet adoptive parents for the first time. She went to take them to patient's room, but they were not comfortable with going until they talked with Karin first. MSW left waiting area to let them talk. Karin reported she would take them to meet patient after.  Encouraged them to contact MSW if needed.

## 2018-11-06 NOTE — PLAN OF CARE
Problem: Patient Care Overview  Goal: Plan of Care Review  Outcome: Ongoing (interventions implemented as appropriate)   @ 7213. APGARS 8/9. BUFA.  Bilateral 2nd degree sulcus tears and perineal tears--both repaired. Bleeding light to moderate throughout recovery--received on bag of Pitocin after delivery. Ambulated with assistance to bathroom, educated on pericare, tucks, and dermoplast. Ice pack used for perineal swelling. Pain controlled without use of medication. VSS. Tolerating regular diet.

## 2018-11-07 VITALS
WEIGHT: 119.25 LBS | HEIGHT: 60 IN | TEMPERATURE: 98 F | OXYGEN SATURATION: 100 % | BODY MASS INDEX: 23.41 KG/M2 | HEART RATE: 87 BPM | DIASTOLIC BLOOD PRESSURE: 76 MMHG | RESPIRATION RATE: 20 BRPM | SYSTOLIC BLOOD PRESSURE: 123 MMHG

## 2018-11-07 PROBLEM — O36.5930 IUGR (INTRAUTERINE GROWTH RESTRICTION) AFFECTING CARE OF MOTHER, THIRD TRIMESTER, NOT APPLICABLE OR UNSPECIFIED FETUS: Status: RESOLVED | Noted: 2018-11-05 | Resolved: 2018-11-07

## 2018-11-07 PROBLEM — O09.32 LATE PRENATAL CARE AFFECTING PREGNANCY IN SECOND TRIMESTER: Status: RESOLVED | Noted: 2018-08-16 | Resolved: 2018-11-07

## 2018-11-07 PROBLEM — O28.3 ABNORMAL FETAL ULTRASOUND: Status: RESOLVED | Noted: 2018-10-22 | Resolved: 2018-11-07

## 2018-11-07 PROBLEM — O26.849 FETAL SIZE INCONSISTENT WITH DATES: Status: RESOLVED | Noted: 2018-09-13 | Resolved: 2018-11-07

## 2018-11-07 PROCEDURE — 99238 HOSP IP/OBS DSCHRG MGMT 30/<: CPT | Mod: ,,, | Performed by: MIDWIFE

## 2018-11-07 PROCEDURE — 25000003 PHARM REV CODE 250: Performed by: ADVANCED PRACTICE MIDWIFE

## 2018-11-07 RX ADMIN — IBUPROFEN 600 MG: 600 TABLET ORAL at 12:11

## 2018-11-07 NOTE — PLAN OF CARE
Problem: Patient Care Overview  Goal: Plan of Care Review  Outcome: Ongoing (interventions implemented as appropriate)  Pt afebrile and had no falls this shift. Fundus firm w/out massage and below umbilicus. Bleeding moderate, no clots passed this shift. Educated patient to notify nurse if she starts bleeding enough to fill her pad in an hour or less and to report any blood clots if passed; verbalized understanding. Voids spontaneously. Ambulates independently. Pain well controlled with oral pain medication. VSS at this time. Will continue to monitor.

## 2018-11-07 NOTE — SUBJECTIVE & OBJECTIVE
Hospital course: Admitted for cytotec induction  cytotec #3 placed  SROM 0445 clear  Epidural  , BUFA, 2nd degree lac   18 PPD1 Appears to be sad, but otherwise doing well. Discussed with MSW who has met with the patient.    Interval History:     She appears to be sad this morning, but otherwise doing well. Discussed with MSW who has met with the patient. Her aunt is with her in the room. She is tolerating a regular diet without nausea or vomiting. She is voiding spontaneously. She is ambulating. She has passed flatus, and has not had a BM. Vaginal bleeding is mild. She denies fever or chills. Abdominal pain is mild and controlled with oral medications. She is not breastfeeding.     Objective:     Vital Signs (Most Recent):  Temp: 98 °F (36.7 °C) (18 0800)  Pulse: 104 (18 0800)  Resp: 20 (18 0800)  BP: 125/81 (18 0800)  SpO2: 100 % (18 0736) Vital Signs (24h Range):  Temp:  [97.5 °F (36.4 °C)-98.6 °F (37 °C)] 98 °F (36.7 °C)  Pulse:  [] 104  Resp:  [18-20] 20  BP: (100-137)/(70-96) 125/81     Weight: 54.1 kg (119 lb 4.3 oz)  Body mass index is 23.29 kg/m².      Intake/Output Summary (Last 24 hours) at 2018 1003  Last data filed at 2018 1700  Gross per 24 hour   Intake --   Output 2000 ml   Net -2000 ml       Significant Labs:  Lab Results   Component Value Date    GROUPTRH O POS 2018    HEPBSAG Negative 2018    STREPBCULT No Group B Streptococcus isolated 10/22/2018     Recent Labs   Lab 18  1312   HGB 13.0   HCT 36.3*       I have personallly reviewed all pertinent lab results from the last 24 hours.    Physical Exam:   Constitutional: She appears well-developed and well-nourished.    HENT:   Head: Normocephalic.     Neck: Normal range of motion. Neck supple.    Cardiovascular: Normal rate.     Pulmonary/Chest: Effort normal.        Abdominal: Soft.     Genitourinary:   Genitourinary Comments: FFML @ U/E, lochia small/mod            Musculoskeletal: Normal range of motion and moves all extremeties.        Skin: Skin is warm and dry.    Psychiatric: She has a normal mood and affect. Her behavior is normal. Judgment and thought content normal.

## 2018-11-07 NOTE — DISCHARGE INSTRUCTIONS

## 2018-11-07 NOTE — ASSESSMENT & PLAN NOTE
Admitted for cytotec induction  cytotec #3 placed  SROM 0445 clear  Epidural  , BUFA, 2nd degree lac   18 PPD1 Appears to be sad, but otherwise doing well. Discussed with MSW who has met with the patient.

## 2018-11-07 NOTE — PROGRESS NOTES
[facility]   Obstetrics  Postpartum Progress Note    Patient Name: Ros Lord  MRN: [id number]  Admission Date: 2018  Hospital Length of Stay: 2 days  Attending Physician: Laura Dave MD  Primary Care Provider: Primary Doctor No    Subjective:     Principal Problem:Normal vaginal delivery    Hospital course: Admitted for cytotec induction  cytotec #3 placed  SROM 0445 clear  Epidural  , BUFA, 2nd degree lac   18 PPD1 Appears to be sad, but otherwise doing well. Discussed with MSW who has met with the patient.    Interval History:     She appears to be sad this morning, but otherwise doing well. Discussed with MSW who has met with the patient. Her aunt is with her in the room. She is tolerating a regular diet without nausea or vomiting. She is voiding spontaneously. She is ambulating. She has passed flatus, and has not had a BM. Vaginal bleeding is mild. She denies fever or chills. Abdominal pain is mild and controlled with oral medications. She is not breastfeeding.     Objective:     Vital Signs (Most Recent):  Temp: 98 °F (36.7 °C) (18 0800)  Pulse: 104 (18 0800)  Resp: 20 (18 0800)  BP: 125/81 (18 0800)  SpO2: 100 % (18 0736) Vital Signs (24h Range):  Temp:  [97.5 °F (36.4 °C)-98.6 °F (37 °C)] 98 °F (36.7 °C)  Pulse:  [] 104  Resp:  [18-20] 20  BP: (100-137)/(70-96) 125/81     Weight: 54.1 kg (119 lb 4.3 oz)  Body mass index is 23.29 kg/m².      Intake/Output Summary (Last 24 hours) at 2018 1003  Last data filed at 2018 1700  Gross per 24 hour   Intake --   Output 2000 ml   Net -2000 ml       Significant Labs:  Lab Results   Component Value Date    GROUPTRH O POS 2018    HEPBSAG Negative 2018    STREPBCULT No Group B Streptococcus isolated 10/22/2018     Recent Labs   Lab 18  1312   HGB 13.0   HCT 36.3*       I have personallly reviewed all pertinent lab results from the last 24 hours.    Physical Exam:    Constitutional: She appears well-developed and well-nourished.    HENT:   Head: Normocephalic.     Neck: Normal range of motion. Neck supple.    Cardiovascular: Normal rate.     Pulmonary/Chest: Effort normal.        Abdominal: Soft.     Genitourinary:   Genitourinary Comments: FFML @ U/E, lochia small/mod           Musculoskeletal: Normal range of motion and moves all extremeties.        Skin: Skin is warm and dry.    Psychiatric: She has a normal mood and affect. Her behavior is normal. Judgment and thought content normal.       Assessment/Plan:     18 y.o. female  for:    * Normal vaginal delivery    Admitted for cytotec induction  cytotec #3 placed  SROM 0445 clear  Epidural  , BUFA, 2nd degree lac   18 PPD1 Appears to be sad, but otherwise doing well. Discussed with MSW who has met with the patient.     Fetal size inconsistent with dates    BPP 6, IUGR  cytotec induction  SROM clear  Epidural per request         Disposition: As patient meets milestones, will plan to discharge tomorrow 18.    Huang Olivarez CNM  Obstetrics  [facility]

## 2018-11-07 NOTE — DISCHARGE SUMMARY
[facility] -   Obstetrics  Discharge Summary      Patient Name: Ros Lord  MRN: [id number]  Admission Date: 2018  Hospital Length of Stay: 2 days  Discharge Date and Time: 18  Attending Physician: Laura Dave MD   Discharging Provider: Huang Olivarez CNM  Primary Care Provider: Primary Doctor No    HPI:  IUP at 38w5d sent from office for IUGR< oligo BPP     * No surgery found *     Hospital Course:   Admitted for cytotec induction  cytotec #3 placed  SROM 0445 clear  Epidural  , BUFA, 2nd degree lac   18 PPD1 Appears to be sad, but otherwise doing well. Discussed with MSW who has met with the patient, pt desires to be discharged, precautions given        Final Active Diagnoses:    Diagnosis Date Noted POA    PRINCIPAL PROBLEM:  Normal vaginal delivery [O80] 2018 Not Applicable    Normal  (single liveborn) [Z38.2] 2018 No    Vaginal laceration [S31.41XA] 2018 No      Problems Resolved During this Admission:    Diagnosis Date Noted Date Resolved POA    IUGR (intrauterine growth restriction) affecting care of mother, third trimester, not applicable or unspecified fetus [O36.5930] 2018 Yes    Fetal size inconsistent with dates [O26.849] 2018 Yes        Labs: All labs within the past 24 hours have been reviewed    Feeding Method: BUFA    Immunizations     Date Immunization Status Dose Route/Site Given by    18 1227 Influenza - Quadrivalent - PF Deleted 0.5 mL Intramuscular/Left deltoid     18 1227 Influenza - Quadrivalent - PF Deleted 0.5 mL Intramuscular/Left deltoid     18 1324 MMR Incomplete 0.5 mL Subcutaneous/Left deltoid     18 1324 Tdap Incomplete 0.5 mL Intramuscular/Left deltoid     18 1434 Influenza - Quadrivalent - PF Incomplete 0.5 mL Intramuscular/Left deltoid           Delivery:    Episiotomy: None   Lacerations: 2nd   Repair suture:     Repair # of packets:  3   Blood loss (ml): 300     Birth information:  YOB: 2020   Time of birth: 7:58 AM   Sex: male   Delivery type: Vaginal, Spontaneous   Gestational Age: 38w6d    Delivery Clinician:      Other providers:       Additional  information:  Forceps:    Vacuum:    Breech:    Observed anomalies      Living?:           APGARS  One minute Five minutes Ten minutes   Skin color:         Heart rate:         Grimace:         Muscle tone:         Breathing:         Totals: 8  9        Placenta: Delivered:       appearance    Pending Diagnostic Studies:     None          Discharged Condition: good    Disposition: Home or Self Care    Follow Up:  Follow-up Information     Follow up In 4 weeks.    Why:  PP               Patient Instructions:      Diet Adult Regular     Notify your health care provider if you experience any of the following:  temperature >100.4     Notify your health care provider if you experience any of the following:  severe uncontrolled pain     Notify your health care provider if you experience any of the following:  redness, tenderness, or signs of infection (pain, swelling, redness, odor or green/yellow discharge around incision site)     Notify your health care provider if you experience any of the following:  severe persistent headache     Notify your health care provider if you experience any of the following:  persistent dizziness, light-headedness, or visual disturbances     Activity as tolerated     Medications:  Current Discharge Medication List      STOP taking these medications       PRENATAL VITAMIN PLUS LOW IRON 27 mg iron- 1 mg Tab Comments:   Reason for Stopping:             Discussed depression with pt and she will seek care from counselor through adoption service, pt aunt very supportive  Huang Olivarez CNM  Obstetrics  [facility] -

## 2018-11-12 ENCOUNTER — HOSPITAL ENCOUNTER (EMERGENCY)
Facility: HOSPITAL | Age: 18
Discharge: HOME | End: 2018-11-12
Attending: FAMILY MEDICINE
Payer: MEDICAID

## 2018-11-12 VITALS
HEART RATE: 108 BPM | BODY MASS INDEX: 19.44 KG/M2 | OXYGEN SATURATION: 97 % | SYSTOLIC BLOOD PRESSURE: 157 MMHG | HEIGHT: 62 IN | TEMPERATURE: 98 F | WEIGHT: 105.63 LBS | RESPIRATION RATE: 20 BRPM | DIASTOLIC BLOOD PRESSURE: 110 MMHG

## 2018-11-12 PROCEDURE — 99283 EMERGENCY DEPT VISIT LOW MDM: CPT

## 2018-11-12 PROCEDURE — 25000003 PHARM REV CODE 250: Performed by: FAMILY MEDICINE

## 2018-11-12 RX ORDER — CLINDAMYCIN HYDROCHLORIDE 150 MG/1
300 CAPSULE ORAL EVERY 8 HOURS
Qty: 21 CAPSULE | Refills: 0 | Status: SHIPPED | OUTPATIENT
Start: 2018-11-12 | End: 2018-11-19

## 2018-11-12 RX ORDER — HYDROCODONE BITARTRATE AND ACETAMINOPHEN 5; 325 MG/1; MG/1
2 TABLET ORAL
Status: DISCONTINUED | OUTPATIENT
Start: 2018-11-12 | End: 2018-11-12

## 2018-11-12 RX ORDER — HYDROCODONE BITARTRATE AND ACETAMINOPHEN 10; 325 MG/1; MG/1
1 TABLET ORAL EVERY 4 HOURS PRN
Qty: 18 TABLET | Refills: 0 | Status: SHIPPED | OUTPATIENT
Start: 2018-11-12 | End: 2018-12-04

## 2018-11-12 RX ORDER — CLINDAMYCIN HYDROCHLORIDE 150 MG/1
300 CAPSULE ORAL
Status: COMPLETED | OUTPATIENT
Start: 2018-11-12 | End: 2018-11-12

## 2018-11-12 RX ADMIN — CLINDAMYCIN HYDROCHLORIDE 300 MG: 150 CAPSULE ORAL at 01:11

## 2018-11-12 NOTE — ED PROVIDER NOTES
SCRIBE #1 NOTE: I, Rubia Cerda, am scribing for, and in the presence of, Karen Zendejas MD. I have scribed the entire note.         History     Chief Complaint   Patient presents with    Vaginal Pain     pt states had baby 6 days ago and tore with stiches hurting - denies bleeding       Review of patient's allergies indicates:  No Known Allergies      History of Present Illness   HPI    11/12/2018, 1:35 AM  History obtained from the patient      History of Present Illness: Ros Lord is a 18 y.o. female patient who presents to the Emergency Department for drainage from suture site which onset gradually today. Patient reports having vaginal delivery on 11/6/18 by GINO Luciano. Patient states she noted purulent drainage today. Patient states she thinks her stitches are coming out. Symptoms are constant and moderate in severity. No mitigating or exacerbating factors reported. Associated sxs include vaginal pain and vaginal bleeding. Patient denies any fever, chills abd pain, N/V/D, dysuria, hematuria, and all other sxs at this time. Patient states she has f/u with OBGYN on 12/3. No further complaints or concerns at this time.     Arrival mode: Personal vehicle     PCP: Primary Doctor No      Past Medical History:  Past medical history reviewed not relevant      Past Surgical History:  Past surgical history reviewed not relevant      Family History:  Family history reviewed not relevant    Social History:  Social History     Tobacco Use    Smoking status: Never Smoker    Smokeless tobacco: Never Used   Substance and Sexual Activity    Alcohol use: No     Frequency: Never    Drug use: No    Sexual activity: Yes     Partners: Male        Review of Systems   Review of Systems   Constitutional: Negative for chills and fever.   HENT: Negative for sore throat.    Respiratory: Negative for shortness of breath.    Cardiovascular: Negative for chest pain.   Gastrointestinal: Negative for abdominal pain,  "diarrhea, nausea and vomiting.   Genitourinary: Positive for vaginal bleeding and vaginal pain. Negative for dysuria and hematuria.   Musculoskeletal: Negative for back pain.   Skin: Negative for rash.        (+) drainage from suture site   Neurological: Negative for weakness.   Hematological: Does not bruise/bleed easily.   All other systems reviewed and are negative.       Physical Exam     Initial Vitals [11/12/18 0117]   BP Pulse Resp Temp SpO2   (!) 157/110 108 20 98.1 °F (36.7 °C) 97 %      MAP       --          Physical Exam  Nursing Notes and Vital Signs Reviewed.  Constitutional: Patient is in no acute distress. Well-developed and well-nourished.  Head: Atraumatic. Normocephalic.  Eyes: PERRL. EOM intact. Conjunctivae are not pale. No scleral icterus.  ENT: Mucous membranes are moist. Oropharynx is clear and symmetric.    Neck: Supple. Full ROM. No lymphadenopathy.  Cardiovascular: Regular rate. Regular rhythm. No murmurs, rubs, or gallops. Distal pulses are 2+ and symmetric.  Pulmonary/Chest: No respiratory distress. Clear to auscultation bilaterally. No wheezing or rales.  Abdominal: Soft and non-distended.  There is no tenderness.  No rebound, guarding, or rigidity.   Pelvic: A female chaperone was present for this examination. Large posterior vaginal wall laceration secondary to spontaneous vaginal delivery with appropriate vaginal bleeding, tender to palpation, no purulence. Laceration does not involve rectum.  Musculoskeletal: Moves all extremities. No obvious deformities.   Skin: Warm and dry.  Neurological:  Alert, awake, and appropriate.  Normal speech.  No acute focal neurological deficits are appreciated.  Psychiatric: Normal affect. Good eye contact. Appropriate in content.     ED Course   Procedures  ED Vital Signs:  Vitals:    11/12/18 0117   BP: (!) 157/110   Pulse: 108   Resp: 20   Temp: 98.1 °F (36.7 °C)   TempSrc: Oral   SpO2: 97%   Weight: 47.9 kg (105 lb 9.6 oz)   Height: 5' 2" (1.575 m) "                 The Emergency Provider reviewed the vital signs and test results, which are outlined above.     ED Discussion     1:47 AM: Initial evaluation of patient. Discussed with pt all pertinent ED information. Discussed pt dx and plan of tx. Gave pt all f/u and return to the ED instructions. All questions and concerns were addressed at this time. Pt expresses understanding of information and instructions, and is comfortable with plan to discharge. Pt is stable for discharge.  Discussed with patient to call OBGYN office tomorrow to move up f/u appointment.    I discussed with patient and/or family/caretaker that evaluation in the ED does not suggest any emergent or life threatening medical conditions requiring immediate intervention beyond what was provided in the ED, and I believe patient is safe for discharge.  Regardless, an unremarkable evaluation in the ED does not preclude the development or presence of a serious of life threatening condition. As such, patient was instructed to return immediately for any worsening or change in current symptoms.    Driving or other activities under influence of medications - Patient and/or family/caretaker was given a prescription for, or instructed to use a medicine that may impair ability to drive, operate machinery, or participate in other potentially dangerous activities.  Patient was instructed not to participate in these activities while under the influence of these medications.      ED Medication(s):  Medications   clindamycin capsule 300 mg (not administered)     Current Discharge Medication List      START taking these medications    Details   clindamycin (CLEOCIN) 150 MG capsule Take 2 capsules (300 mg total) by mouth every 8 (eight) hours. for 7 days  Qty: 21 capsule, Refills: 0      HYDROcodone-acetaminophen (NORCO)  mg per tablet Take 1 tablet by mouth every 4 (four) hours as needed for Pain.  Qty: 18 tablet, Refills: 0             Follow-up  Information     Primary Doctor No. Schedule an appointment as soon as possible for a visit in 1 day.                      Medical Decision Making                 Scribe Attestation:   Scribe #1: I performed the above scribed service and the documentation accurately describes the services I performed. I attest to the accuracy of the note.     Attending:   Physician Attestation Statement for Scribe #1: I, Karen Zendejas MD, personally performed the services described in this documentation, as scribed by Rubia Cerda, in my presence, and it is both accurate and complete.           Clinical Impression       ICD-10-CM ICD-9-CM   1. Obstetric vaginal laceration O71.4 665.41       Disposition:   Disposition: Discharged  Condition: Stable         Karen Zendejas MD  11/12/18 2017

## 2018-11-13 ENCOUNTER — OFFICE VISIT (OUTPATIENT)
Dept: OBSTETRICS AND GYNECOLOGY | Facility: CLINIC | Age: 18
End: 2018-11-13
Payer: MEDICAID

## 2018-11-13 VITALS
SYSTOLIC BLOOD PRESSURE: 110 MMHG | DIASTOLIC BLOOD PRESSURE: 64 MMHG | WEIGHT: 105.63 LBS | BODY MASS INDEX: 19.31 KG/M2

## 2018-11-13 DIAGNOSIS — S31.41XD LACERATION OF VAGINA, SUBSEQUENT ENCOUNTER: Primary | ICD-10-CM

## 2018-11-13 DIAGNOSIS — R52 PAIN RELATED TO VAGINAL DELIVERY: ICD-10-CM

## 2018-11-13 PROCEDURE — 99212 OFFICE O/P EST SF 10 MIN: CPT | Mod: PBBFAC,TH | Performed by: ADVANCED PRACTICE MIDWIFE

## 2018-11-13 PROCEDURE — 99999 PR PBB SHADOW E&M-EST. PATIENT-LVL II: CPT | Mod: PBBFAC,,, | Performed by: ADVANCED PRACTICE MIDWIFE

## 2018-11-13 NOTE — PROGRESS NOTES
"18 y.o. female for a 1 week postpartum visit.  Patient is concerned her stiches are "coming loose and draining".  Her delivery records were reviewed.  Baby was adopted and it is a semi closed adoption.   Exam  General - well appearing, no apparent distress  Abdomen - soft, non tender, non distended.  Pelvic - normal external genitalia,               uterus non tender, appropriately sized, laceration appears as though the first knot has come loose but incision appears to be intact and not draining.  Extremeties - no edema    Assessment:  Vaginal pain related to .  Vaginal laceration    F/u - return for routine PP exam.   Encouraged pt to do warm soaks in the bath tub and nothing in the vagina until follow up visit.   "

## 2018-12-04 ENCOUNTER — POSTPARTUM VISIT (OUTPATIENT)
Dept: OBSTETRICS AND GYNECOLOGY | Facility: CLINIC | Age: 18
End: 2018-12-04
Payer: MEDICAID

## 2018-12-04 VITALS
DIASTOLIC BLOOD PRESSURE: 80 MMHG | SYSTOLIC BLOOD PRESSURE: 110 MMHG | WEIGHT: 102.06 LBS | BODY MASS INDEX: 18.67 KG/M2

## 2018-12-04 DIAGNOSIS — Z30.011 ENCOUNTER FOR INITIAL PRESCRIPTION OF CONTRACEPTIVE PILLS: ICD-10-CM

## 2018-12-04 PROBLEM — R52 PAIN RELATED TO VAGINAL DELIVERY: Status: RESOLVED | Noted: 2018-11-13 | Resolved: 2018-12-04

## 2018-12-04 PROBLEM — S31.41XA VAGINAL LACERATION: Status: RESOLVED | Noted: 2018-11-06 | Resolved: 2018-12-04

## 2018-12-04 PROCEDURE — 99213 OFFICE O/P EST LOW 20 MIN: CPT | Mod: PBBFAC | Performed by: MIDWIFE

## 2018-12-04 PROCEDURE — 99999 PR PBB SHADOW E&M-EST. PATIENT-LVL III: CPT | Mod: PBBFAC,,, | Performed by: MIDWIFE

## 2018-12-04 PROCEDURE — 0503F POSTPARTUM CARE VISIT: CPT | Mod: ,,, | Performed by: MIDWIFE

## 2018-12-04 RX ORDER — NORETHINDRONE ACETATE AND ETHINYL ESTRADIOL .02; 1 MG/1; MG/1
1 TABLET ORAL DAILY
Qty: 28 TABLET | Refills: 12 | Status: SHIPPED | OUTPATIENT
Start: 2018-12-04 | End: 2019-12-03

## 2018-12-04 RX ORDER — SERTRALINE HYDROCHLORIDE 25 MG/1
25 TABLET, FILM COATED ORAL DAILY
Qty: 30 TABLET | Refills: 2 | Status: SHIPPED | OUTPATIENT
Start: 2018-12-04 | End: 2019-05-30 | Stop reason: SDUPTHER

## 2018-12-04 NOTE — PROGRESS NOTES
Subjective:       Ros Lord is a 18 y.o. female who presents for a postpartum visit. She is 4 weeks postpartum following a spontaneous vaginal delivery. I have fully reviewed the prenatal and intrapartum course. The delivery was at 38 5/7 gestational weeks. Outcome: spontaneous vaginal delivery. Anesthesia: epidural. Postpartum course has been unremarkable. Baby's course has been BUFA. Bleeding no bleeding. Bowel function is normal. Bladder function is normal. Patient is not sexually active. Contraception method is abstinence. Postpartum depression screening: positive. Pt lives with sister in law and family, states she stays by herself because it is crowded. Denies SI/HI    The following portions of the patient's history were reviewed and updated as appropriate: allergies, current medications, past family history, past medical history, past social history, past surgical history and problem list.    Review of Systems  A comprehensive review of systems was negative except for: Behavioral/Psych: positive for anxiety and depression     Objective:      /80   Wt 46.3 kg (102 lb 1.2 oz)   BMI 18.67 kg/m²    General:  alert, appears stated age, cooperative and no distress    Vulva:  normal   Vagina: normal vagina, no discharge, exudate, lesion, or erythema          Assessment:       Routine postpartum exam. Pap smear not done at today's visit.   Post Partum Depression  Plan:      1. Contraception: OCP (estrogen/progesterone)  2. Zoloft 25 mg nightly  3. Follow up in: 1 year or as needed.

## 2018-12-04 NOTE — PATIENT INSTRUCTIONS
Birth Control: The Pill    Birth control pills contain hormones that help prevent pregnancy. The pills are prescribed by your healthcare provider. There are many types of birth control pills available. If you have side effects from one type of pill, tell your healthcare provider. He or she may be able to prescribe a pill that works better for you.  Pregnancy rates  Talk to your healthcare provider about the effectiveness of this birth control method.  Using the pill  · Take one pill daily. Take it at around the same time each day.  · Follow your healthcare providers guidelines on when to start your first pack of pills. You may need to use another form of birth control for a week or more after you start.  · Know what to do if you forget to take a pill. (Consult your healthcare provider or check the package.) If you miss more than one pill, you may need to use a backup method of birth control for a week or more.  Pros  · Low pregnancy rate  · No interruption to sex  · Easy to use  · Can help make periods more regular  · May lower your risk of ovarian cysts and certain cancers  · May decrease menstrual cramps, menstrual flow, and acne  Cons  · Does not protect against sexually transmitted infection (STIs)  · Requires taking a pill on time each day  · May not work as well when taken with certain other medicines (check with your pharmacist)  · May cause side effects such as nausea, irregular bleeding, headaches, breast tenderness, fatigue, or mood changes (these often go away within 3 months)  · May increase the risk of blood clots, heart attack, and stroke  The pill may not be for you  The pill may not be for you if:  · You are a smoker and over age 35  · You have high blood pressure or gallbladder, liver, cerebrovascular  or heart disease  · You have diabetes, migraines, blood clot in the vein or artery, lupus, depression, certain lipid disorders, or take medicines that interfere with the pill  In these cases,  discuss the risks with your healthcare provider.  Date Last Reviewed: 3/1/2017  © 9651-2107 [employer]. [address] [place] [address], [place] [address]. All rights reserved. This information is not intended as a substitute for professional medical care. Always follow your healthcare professional's instructions.

## 2019-05-31 RX ORDER — SERTRALINE HYDROCHLORIDE 25 MG/1
TABLET, FILM COATED ORAL
Qty: 30 TABLET | Refills: 2 | Status: SHIPPED | OUTPATIENT
Start: 2019-05-31